# Patient Record
Sex: MALE | Race: WHITE | NOT HISPANIC OR LATINO | URBAN - METROPOLITAN AREA
[De-identification: names, ages, dates, MRNs, and addresses within clinical notes are randomized per-mention and may not be internally consistent; named-entity substitution may affect disease eponyms.]

---

## 2021-04-02 DIAGNOSIS — Z23 ENCOUNTER FOR IMMUNIZATION: ICD-10-CM

## 2021-04-12 ENCOUNTER — IMMUNIZATIONS (OUTPATIENT)
Dept: FAMILY MEDICINE CLINIC | Facility: HOSPITAL | Age: 31
End: 2021-04-12

## 2021-04-12 DIAGNOSIS — Z23 ENCOUNTER FOR IMMUNIZATION: Primary | ICD-10-CM

## 2021-04-12 PROCEDURE — 91300 SARS-COV-2 / COVID-19 MRNA VACCINE (PFIZER-BIONTECH) 30 MCG: CPT

## 2021-04-12 PROCEDURE — 0001A SARS-COV-2 / COVID-19 MRNA VACCINE (PFIZER-BIONTECH) 30 MCG: CPT

## 2021-05-04 ENCOUNTER — IMMUNIZATIONS (OUTPATIENT)
Dept: FAMILY MEDICINE CLINIC | Facility: HOSPITAL | Age: 31
End: 2021-05-04

## 2021-05-04 DIAGNOSIS — Z23 ENCOUNTER FOR IMMUNIZATION: Primary | ICD-10-CM

## 2021-05-04 PROCEDURE — 91300 SARS-COV-2 / COVID-19 MRNA VACCINE (PFIZER-BIONTECH) 30 MCG: CPT

## 2021-05-04 PROCEDURE — 0002A SARS-COV-2 / COVID-19 MRNA VACCINE (PFIZER-BIONTECH) 30 MCG: CPT

## 2022-10-21 ENCOUNTER — OFFICE VISIT (OUTPATIENT)
Dept: UROLOGY | Facility: CLINIC | Age: 32
End: 2022-10-21
Payer: COMMERCIAL

## 2022-10-21 VITALS
BODY MASS INDEX: 42.56 KG/M2 | RESPIRATION RATE: 16 BRPM | WEIGHT: 280.8 LBS | HEART RATE: 80 BPM | SYSTOLIC BLOOD PRESSURE: 120 MMHG | HEIGHT: 68 IN | OXYGEN SATURATION: 96 % | DIASTOLIC BLOOD PRESSURE: 70 MMHG

## 2022-10-21 DIAGNOSIS — Z30.2 ENCOUNTER FOR STERILIZATION: Primary | ICD-10-CM

## 2022-10-21 PROCEDURE — 99203 OFFICE O/P NEW LOW 30 MIN: CPT | Performed by: PHYSICIAN ASSISTANT

## 2022-10-21 RX ORDER — DIAZEPAM 5 MG/1
TABLET ORAL
Qty: 2 TABLET | Refills: 0 | Status: SHIPPED | OUTPATIENT
Start: 2022-10-21

## 2022-10-21 NOTE — PROGRESS NOTES
Referring Physician: No primary care provider on file  A copy of this consultation note was communicated to the referring physician  Diagnoses and all orders for this visit:    Encounter for sterilization  -     diazepam (VALIUM) 5 mg tablet; Take both tablets 1 hour prior to the procedure    Other orders  -     dexamethasone (DECADRON) 0 75 mg tablet;  (Patient not taking: Reported on 10/21/2022)            Assessment and plan:       We had a long discussion regarding the options for birth control  I told the patient that vasectomy is considered to be a permanent surgical sterilization procedure  We spoke about other options including the possibility of vasectomy reversal at a later time  He understands that vasectomy confers no immunity to STDs  I also told him that according to our present knowledge, there is no causal relationship between vasectomy and subsequent development of prostate cancer or testicular cancer  No change in libido erection or ejaculation  We spoke about the potential complications  The most common one in the short term is scrotal hematoma and infection, which rarely requires re-operation  Additionally, he can react to the anesthetic, develop scrotal swelling, have pain or skin bruising  We spoke about post procedure care to try to minimize this complication  I also asked him to refrain from aspirin or fish oil products and alcohol prior to the procedure  The long-term complications include but are not limited to vasectomy failure by recanalization, chronic epididymal discomfort, pain, among other possibilities  I described to him how this procedure is normally performed in an office setting and he understands that if anesthesia is desired, this can be performed for him in an outpatient operative setting  Finally, he understands that following vasectomy, he’ll need to use other means of birth control until he’s had semen analyses that demonstrate the absence of sperm  He understands it will be his responsibility to submit these semen specimens and call our office for the results  I told him again that recanalization is a small but real possibility, and if he is ever concerned about it he can submit another semen specimen for analysis  After discussing the risks, benefits, possible complications and alternatives, informed consents were obtained  Diazepam was prescribed, and review dosing, adverse effects and timing of the procedure  He will return in the near future for the procedure  Chief Complaint     Desire for vasectomy      History of Present Illness     Jennifer Landa is a 34 y o  male referred for evaluation of vasectomy  He has 3 biological children  He states that he and his partner have come to the mutual decision they do not desire any additional children  He has no prior past medical, past surgical, or past urologic history    Detailed Urologic History     - please refer to HPI    Review of Systems     Review of Systems   Constitutional: Negative for activity change and fatigue  HENT: Negative for congestion  Eyes: Negative for visual disturbance  Respiratory: Negative for shortness of breath and wheezing  Cardiovascular: Negative for chest pain and leg swelling  Gastrointestinal: Negative for abdominal pain  Endocrine: Negative for polyuria  Genitourinary: Negative for dysuria, flank pain, hematuria and urgency  Musculoskeletal: Negative for back pain  Allergic/Immunologic: Negative for immunocompromised state  Neurological: Negative for dizziness and numbness  Psychiatric/Behavioral: Negative for dysphoric mood  All other systems reviewed and are negative  Allergies     Not on File    Physical Exam     Physical Exam   Constitutional: He is oriented to person, place, and time  He appears well-developed and well-nourished  No distress  HENT:   Head: Normocephalic and atraumatic     Eyes: EOM are normal  Neck: Normal range of motion  Cardiovascular:   Negative lower extremity edema   Pulmonary/Chest: Effort normal and breath sounds normal    Abdominal: Soft  Genitourinary:   Genitourinary Comments: Vas deferens are palpable bilaterally  Musculoskeletal: Normal range of motion  Neurological: He is alert and oriented to person, place, and time  Skin: Skin is warm  Psychiatric: He has a normal mood and affect  His behavior is normal          Vital Signs  There were no vitals filed for this visit  Current Medications     No current outpatient medications on file  Active Problems     There is no problem list on file for this patient  Past Medical History     No past medical history on file  Surgical History     No past surgical history on file  Family History     No family history on file  Social History     Social History     Social History     Tobacco Use   Smoking Status Not on file   Smokeless Tobacco Not on file       Portions of the record may have been created with voice recognition software  Occasional wrong word or "sound a like" substitutions may have occurred due to the inherent limitations of voice recognition software  Read the chart carefully and recognize, using context, where substitutions have occurred

## 2023-01-11 ENCOUNTER — PROCEDURE VISIT (OUTPATIENT)
Dept: UROLOGY | Facility: CLINIC | Age: 33
End: 2023-01-11

## 2023-01-11 ENCOUNTER — APPOINTMENT (OUTPATIENT)
Dept: LAB | Facility: HOSPITAL | Age: 33
End: 2023-01-11

## 2023-01-11 VITALS
HEIGHT: 68 IN | SYSTOLIC BLOOD PRESSURE: 128 MMHG | HEART RATE: 128 BPM | BODY MASS INDEX: 42.62 KG/M2 | OXYGEN SATURATION: 98 % | DIASTOLIC BLOOD PRESSURE: 70 MMHG | RESPIRATION RATE: 16 BRPM | WEIGHT: 281.2 LBS

## 2023-01-11 DIAGNOSIS — Z30.2 ENCOUNTER FOR STERILIZATION: Primary | ICD-10-CM

## 2023-01-11 NOTE — PROGRESS NOTES
Procedures      No Scalpel Vasectomy Procedure Note    Indications: 34 y o   y o  male desiring permanent sterilization    Pre-operative Diagnosis: Undesired fertility    Post-operative Diagnosis: Undesired fertility    Anesthesia: Lidocaine 2% without epinephrine      Procedure Details     The risks and benefits of the procedure were discussed at the pre-procedure consultation, and written, informed consent obtained  Premedicated with Valium 10 mgm po 60 minutes prior to procedure  The scrotum was palpated with both testes normal in size and position, no masses palpitated  The scrotum was cleansed with warm Betadine and draped in the usual sterile manner  Initial physical exam revealed an easily palpable left vas deferens  I thought that the right vas was palpable though it was difficult to do so and it appeared significantly adhesed to a thickened   right spermatic cord  And her left-sided vasectomy was accomplished  After adequate anesthesia was established, a small perforation was made in the skin and the left vas was isolated with the ring forceps, dissected free and delivered through the skin perforation  The left vas was divided, approximately 1 5 cm portion removed, and each end of the vas was cauterized and suture ligated  The ends of the vas were replaced in the scrotum through the puncture site  Attempted to perform the right sided vasectomy  However I could not palpate or isolate the right vas deferens within the hemiscrotum  This is due to the combination of the patient's thickened spermatic cord and his body habitus  I felt that the procedure would best be accomplished under anesthesia  Any bleeding was controlled with electrocautery  The puncture site was dry when the procedure was completed  After discussion with the patient, the puncture site was sutured using single 5-0 chromic suture in figure 8 fashion  Specimen:   1     Left vas deferens    Condition: Stable    Complications: none    Plan:    Recommended performing his completion right vasectomy in the operating room setting under sedation  Risk benefits and alternatives were discussed with the patient and his wife and both agreed with this plan  I will add him onto my OR schedule for Friday

## 2023-01-12 ENCOUNTER — TELEPHONE (OUTPATIENT)
Dept: UROLOGY | Facility: MEDICAL CENTER | Age: 33
End: 2023-01-12

## 2023-01-12 ENCOUNTER — TELEPHONE (OUTPATIENT)
Dept: UROLOGY | Facility: AMBULATORY SURGERY CENTER | Age: 33
End: 2023-01-12

## 2023-01-12 ENCOUNTER — ANESTHESIA EVENT (OUTPATIENT)
Dept: PERIOP | Facility: AMBULARY SURGERY CENTER | Age: 33
End: 2023-01-12

## 2023-01-12 DIAGNOSIS — Z91.89 AT RISK FOR OBSTRUCTIVE SLEEP APNEA: Primary | ICD-10-CM

## 2023-01-12 NOTE — TELEPHONE ENCOUNTER
I spoke with pt this morning and confirmed scheduling his R  Vasectomy at the [facility] with Dr Caroline Rajput tomorrow on 1/13/23  Pt confirmed that this will work for him  I verbally went over all of pt 's pre op instructions and prep information with him  He is aware that he will need to have a  and he will wait to hear back from the hospital later today regarding his arrival time  Per pt 's request I am e-mailing him a copy of his surgical packet and instructed him to call our office back with any questions or concerns regarding this procedure

## 2023-01-12 NOTE — PRE-PROCEDURE INSTRUCTIONS
NPO after midnight, meds in am with sips of water  Understands when to expect preop phone call  Remove all jewelry  Advised of visitation policy  Before your operation, you play an important role in decreasing your risk for infection by washing with special antiseptic soap  This is an effective way to reduce bacteria on the skin which may help to prevent infections at the surgical site  Please read the following directions in advance  · Use antibacterial soap  2  The day before your operation follow these directions carefully to get ready  · Place clean lines (sheets) on your bed; you should sleep on clean sheets after your evening shower  · Get clean towels and washcloths ready - you need enough for 2 showers  · Set aside clean underwear, pajamas, and clothing to wear after the shower  Reminders:  · DO NOT use any other soap or body rinse on your skin during or after the antiseptic showers  · DO NOT use lotion , powder, deodorant, or perfume/aftershave of any kind on your skin after your antiseptic shower  · DO NOT shave any body parts in the 24 hours/the day before your operation  · DO NOT get the antiseptic soap in your eyes, ears, nose, mouth, or vaginal area  3      You will need to shower the night before AND the morning of your Surgery  Shower 1:  · The evening before your operation, take the fist shower  · First, shampoo your hair with regular shampoo and rinse it completely before you use the anitseptic soap  After washing and rinsing your hair, rinse your body  · Next, use a clean wash cloth to apply the antiseptic soap and wash your body from the neck down to your toes using 1/2 bottle of the antiseptic soap  You will use the other 1/2 bottle for the second shower  · Clean the area where your incision will be; later this area well for about 2 minutes  · If you ar having head or neck surgery, wash areas with the antiseptic soap    · Rinse yourself completely with running water   · Use a clean towel to dry off  · Wear clean underwear and clothing/pajamas  Shower 2:  · The Morning of your operation, take the second shower following the same steps as Shower 1 using the second 1/2 of the bottle of antiseptic soap  · Use clean cloths and towels to was and dry yourself off  · Wear clean underwear and clothing

## 2023-01-13 ENCOUNTER — HOSPITAL ENCOUNTER (OUTPATIENT)
Facility: AMBULARY SURGERY CENTER | Age: 33
Setting detail: OUTPATIENT SURGERY
Discharge: HOME | End: 2023-01-13
Attending: UROLOGY | Admitting: UROLOGY

## 2023-01-13 ENCOUNTER — OFFICE VISIT (OUTPATIENT)
Dept: FAMILY MEDICINE CLINIC | Facility: CLINIC | Age: 33
End: 2023-01-13

## 2023-01-13 ENCOUNTER — ANESTHESIA (OUTPATIENT)
Dept: PERIOP | Facility: AMBULARY SURGERY CENTER | Age: 33
End: 2023-01-13

## 2023-01-13 VITALS
OXYGEN SATURATION: 99 % | WEIGHT: 280.6 LBS | HEART RATE: 78 BPM | SYSTOLIC BLOOD PRESSURE: 130 MMHG | HEIGHT: 68 IN | BODY MASS INDEX: 42.53 KG/M2 | DIASTOLIC BLOOD PRESSURE: 76 MMHG | TEMPERATURE: 98.3 F

## 2023-01-13 VITALS
DIASTOLIC BLOOD PRESSURE: 98 MMHG | SYSTOLIC BLOOD PRESSURE: 158 MMHG | OXYGEN SATURATION: 96 % | TEMPERATURE: 97.6 F | HEART RATE: 88 BPM | RESPIRATION RATE: 20 BRPM

## 2023-01-13 DIAGNOSIS — R53.83 OTHER FATIGUE: ICD-10-CM

## 2023-01-13 DIAGNOSIS — R06.2 WHEEZE: ICD-10-CM

## 2023-01-13 DIAGNOSIS — Z23 NEED FOR TETANUS BOOSTER: ICD-10-CM

## 2023-01-13 DIAGNOSIS — E66.01 SEVERE OBESITY (BMI >= 40) (HCC): Primary | ICD-10-CM

## 2023-01-13 DIAGNOSIS — E78.2 MIXED HYPERLIPIDEMIA: Primary | ICD-10-CM

## 2023-01-13 RX ORDER — BUPIVACAINE HYDROCHLORIDE 5 MG/ML
INJECTION, SOLUTION PERINEURAL AS NEEDED
Status: DISCONTINUED | OUTPATIENT
Start: 2023-01-13 | End: 2023-01-13 | Stop reason: HOSPADM

## 2023-01-13 RX ORDER — SODIUM CHLORIDE, SODIUM LACTATE, POTASSIUM CHLORIDE, CALCIUM CHLORIDE 600; 310; 30; 20 MG/100ML; MG/100ML; MG/100ML; MG/100ML
100 INJECTION, SOLUTION INTRAVENOUS CONTINUOUS
Status: DISCONTINUED | OUTPATIENT
Start: 2023-01-13 | End: 2023-01-13 | Stop reason: HOSPADM

## 2023-01-13 RX ORDER — ALBUTEROL SULFATE 90 UG/1
2 AEROSOL, METERED RESPIRATORY (INHALATION) EVERY 6 HOURS PRN
Qty: 6.7 G | Refills: 5 | Status: SHIPPED | OUTPATIENT
Start: 2023-01-13

## 2023-01-13 RX ORDER — DEXAMETHASONE SODIUM PHOSPHATE 10 MG/ML
INJECTION, SOLUTION INTRAMUSCULAR; INTRAVENOUS AS NEEDED
Status: DISCONTINUED | OUTPATIENT
Start: 2023-01-13 | End: 2023-01-13

## 2023-01-13 RX ORDER — LIDOCAINE HYDROCHLORIDE 20 MG/ML
INJECTION, SOLUTION EPIDURAL; INFILTRATION; INTRACAUDAL; PERINEURAL AS NEEDED
Status: DISCONTINUED | OUTPATIENT
Start: 2023-01-13 | End: 2023-01-13

## 2023-01-13 RX ORDER — FENTANYL CITRATE 50 UG/ML
INJECTION, SOLUTION INTRAMUSCULAR; INTRAVENOUS AS NEEDED
Status: DISCONTINUED | OUTPATIENT
Start: 2023-01-13 | End: 2023-01-13

## 2023-01-13 RX ORDER — FENTANYL CITRATE/PF 50 MCG/ML
25 SYRINGE (ML) INJECTION
Status: DISCONTINUED | OUTPATIENT
Start: 2023-01-13 | End: 2023-01-13 | Stop reason: HOSPADM

## 2023-01-13 RX ORDER — MAGNESIUM HYDROXIDE 1200 MG/15ML
LIQUID ORAL AS NEEDED
Status: DISCONTINUED | OUTPATIENT
Start: 2023-01-13 | End: 2023-01-13 | Stop reason: HOSPADM

## 2023-01-13 RX ORDER — ONDANSETRON 2 MG/ML
4 INJECTION INTRAMUSCULAR; INTRAVENOUS ONCE AS NEEDED
Status: DISCONTINUED | OUTPATIENT
Start: 2023-01-13 | End: 2023-01-13 | Stop reason: HOSPADM

## 2023-01-13 RX ORDER — MIDAZOLAM HYDROCHLORIDE 2 MG/2ML
INJECTION, SOLUTION INTRAMUSCULAR; INTRAVENOUS AS NEEDED
Status: DISCONTINUED | OUTPATIENT
Start: 2023-01-13 | End: 2023-01-13

## 2023-01-13 RX ORDER — ONDANSETRON 2 MG/ML
INJECTION INTRAMUSCULAR; INTRAVENOUS AS NEEDED
Status: DISCONTINUED | OUTPATIENT
Start: 2023-01-13 | End: 2023-01-13

## 2023-01-13 RX ORDER — PROPOFOL 10 MG/ML
INJECTION, EMULSION INTRAVENOUS AS NEEDED
Status: DISCONTINUED | OUTPATIENT
Start: 2023-01-13 | End: 2023-01-13

## 2023-01-13 RX ORDER — CEFAZOLIN SODIUM 2 G/50ML
2000 SOLUTION INTRAVENOUS ONCE
Status: DISCONTINUED | OUTPATIENT
Start: 2023-01-13 | End: 2023-01-13 | Stop reason: HOSPADM

## 2023-01-13 RX ORDER — OXYCODONE HYDROCHLORIDE 5 MG/1
5 TABLET ORAL EVERY 4 HOURS PRN
Status: DISCONTINUED | OUTPATIENT
Start: 2023-01-13 | End: 2023-01-13 | Stop reason: HOSPADM

## 2023-01-13 RX ORDER — OXYCODONE HYDROCHLORIDE 5 MG/1
5 TABLET ORAL EVERY 4 HOURS PRN
Qty: 5 TABLET | Refills: 0 | Status: SHIPPED | OUTPATIENT
Start: 2023-01-13 | End: 2023-01-16 | Stop reason: SDUPTHER

## 2023-01-13 RX ORDER — SODIUM CHLORIDE, SODIUM LACTATE, POTASSIUM CHLORIDE, CALCIUM CHLORIDE 600; 310; 30; 20 MG/100ML; MG/100ML; MG/100ML; MG/100ML
INJECTION, SOLUTION INTRAVENOUS CONTINUOUS PRN
Status: DISCONTINUED | OUTPATIENT
Start: 2023-01-13 | End: 2023-01-13

## 2023-01-13 RX ORDER — HYDRALAZINE HYDROCHLORIDE 20 MG/ML
10 INJECTION INTRAMUSCULAR; INTRAVENOUS ONCE
Status: DISCONTINUED | OUTPATIENT
Start: 2023-01-13 | End: 2023-01-13 | Stop reason: HOSPADM

## 2023-01-13 RX ADMIN — MIDAZOLAM 2 MG: 1 INJECTION INTRAMUSCULAR; INTRAVENOUS at 13:38

## 2023-01-13 RX ADMIN — PROPOFOL 100 MG: 10 INJECTION, EMULSION INTRAVENOUS at 13:53

## 2023-01-13 RX ADMIN — PROPOFOL 300 MG: 10 INJECTION, EMULSION INTRAVENOUS at 13:43

## 2023-01-13 RX ADMIN — PROPOFOL 50 MG: 10 INJECTION, EMULSION INTRAVENOUS at 14:03

## 2023-01-13 RX ADMIN — FENTANYL CITRATE 25 MCG: 50 INJECTION INTRAMUSCULAR; INTRAVENOUS at 13:46

## 2023-01-13 RX ADMIN — SODIUM CHLORIDE, SODIUM LACTATE, POTASSIUM CHLORIDE, AND CALCIUM CHLORIDE: .6; .31; .03; .02 INJECTION, SOLUTION INTRAVENOUS at 13:39

## 2023-01-13 RX ADMIN — OXYCODONE HYDROCHLORIDE 5 MG: 5 TABLET ORAL at 15:39

## 2023-01-13 RX ADMIN — PROPOFOL 100 MG: 10 INJECTION, EMULSION INTRAVENOUS at 13:56

## 2023-01-13 RX ADMIN — FENTANYL CITRATE 25 MCG: 50 INJECTION INTRAMUSCULAR; INTRAVENOUS at 14:15

## 2023-01-13 RX ADMIN — ONDANSETRON 4 MG: 2 INJECTION INTRAMUSCULAR; INTRAVENOUS at 13:53

## 2023-01-13 RX ADMIN — FENTANYL CITRATE 25 MCG: 50 INJECTION INTRAMUSCULAR; INTRAVENOUS at 13:54

## 2023-01-13 RX ADMIN — FENTANYL CITRATE 25 MCG: 50 INJECTION INTRAMUSCULAR; INTRAVENOUS at 13:58

## 2023-01-13 RX ADMIN — DEXAMETHASONE SODIUM PHOSPHATE 10 MG: 10 INJECTION, SOLUTION INTRAMUSCULAR; INTRAVENOUS at 13:44

## 2023-01-13 RX ADMIN — LIDOCAINE HYDROCHLORIDE 100 MG: 20 INJECTION, SOLUTION EPIDURAL; INFILTRATION; INTRACAUDAL; PERINEURAL at 13:43

## 2023-01-13 NOTE — ANESTHESIA POSTPROCEDURE EVALUATION
Post-Op Assessment Note    CV Status:  Stable  Pain Score: 0    Pain management: adequate     Mental Status:  Alert   Hydration Status:  Euvolemic   PONV Controlled:  None   Airway Patency:  Patent   Two or more mitigation strategies used for obstructive sleep apnea   Post Op Vitals Reviewed: Yes      Staff: Anesthesiologist, CRNA         No notable events documented      BP (!) 174/98 (01/13/23 1437)    Temp 97 5 °F (36 4 °C) (01/13/23 1437)    Pulse 86 (01/13/23 1437)   Resp 15 (01/13/23 1437)    SpO2 94 % (01/13/23 1437)

## 2023-01-13 NOTE — PROGRESS NOTES
Assessment/Plan:    No problem-specific Assessment & Plan notes found for this encounter  Diagnoses and all orders for this visit:    Mixed hyperlipidemia  -     Hemoglobin A1c (w/out EAG); Future  -     CBC and differential; Future  -     Comprehensive metabolic panel; Future  -     Lipid Panel with Direct LDL reflex; Future  -     Testosterone; Future  -     T4, free; Future  -     TSH, 3rd generation; Future    Other fatigue  -     Hemoglobin A1c (w/out EAG); Future  -     CBC and differential; Future  -     Comprehensive metabolic panel; Future  -     Lipid Panel with Direct LDL reflex; Future  -     Testosterone; Future  -     T4, free; Future  -     TSH, 3rd generation; Future    Wheeze  -     albuterol (Proventil HFA) 90 mcg/act inhaler; Inhale 2 puffs every 6 (six) hours as needed for wheezing          Subjective:   Chief Complaint   Patient presents with   • [facility]        Patient ID: Royal Sahu is a 34 y o  male  HPI    The following portions of the patient's history were reviewed and updated as appropriate: allergies, current medications, past family history, past medical history, past social history, past surgical history and problem list     Review of Systems   Constitutional: Negative for fatigue, fever and unexpected weight change  HENT: Negative for congestion, sinus pain and sore throat  Eyes: Negative for visual disturbance  Respiratory: Negative for shortness of breath and wheezing  Cardiovascular: Negative for chest pain and palpitations  Gastrointestinal: Negative for abdominal pain, nausea and vomiting  Musculoskeletal: Negative  Negative for arthralgias and myalgias  Neurological: Negative for syncope, weakness and numbness  Psychiatric/Behavioral: Negative  Negative for confusion, dysphoric mood and suicidal ideas           Objective:  Vitals:    01/13/23 1008   BP: 130/76   BP Location: Left arm   Patient Position: Sitting   Cuff Size: Standard Pulse: 78   Temp: 98 3 °F (36 8 °C)   TempSrc: Tympanic   SpO2: 99%   Weight: 127 kg (280 lb 9 6 oz)   Height: 5' 8" (1 727 m)      Physical Exam  Constitutional:       Appearance: He is well-developed  HENT:      Right Ear: Ear canal normal  Tympanic membrane is not injected  Left Ear: Ear canal normal  Tympanic membrane is not injected  Nose: Nose normal    Eyes:      General:         Right eye: No discharge  Left eye: No discharge  Conjunctiva/sclera: Conjunctivae normal       Pupils: Pupils are equal, round, and reactive to light  Neck:      Thyroid: No thyromegaly  Cardiovascular:      Rate and Rhythm: Normal rate and regular rhythm  Heart sounds: Normal heart sounds  No murmur heard  Pulmonary:      Effort: Pulmonary effort is normal  No respiratory distress  Breath sounds: Normal breath sounds  No wheezing  Abdominal:      General: Bowel sounds are normal  There is no distension  Palpations: Abdomen is soft  Tenderness: There is no abdominal tenderness  Musculoskeletal:         General: Normal range of motion  Cervical back: Normal range of motion and neck supple  Lymphadenopathy:      Cervical: No cervical adenopathy  Skin:     General: Skin is warm and dry  Neurological:      Mental Status: He is alert and oriented to person, place, and time  He is not disoriented  Sensory: No sensory deficit  Gait: Gait normal       Deep Tendon Reflexes: Reflexes are normal and symmetric  Psychiatric:         Speech: Speech normal          Behavior: Behavior normal          Thought Content: Thought content normal          Judgment: Judgment normal      Depression Screening Follow-up Plan: Patient's depression screening was positive with a PHQ-2 score of 2  Their PHQ-9 score was 6  Patient assessed for underlying major depression  They have no active suicidal ideations   Brief counseling provided and recommend additional follow-up/re-evaluation next office visit

## 2023-01-13 NOTE — H&P
UROLOGY HISTORY AND PHYSICAL     Patient Identifiers: Angie Minaya (MRN [id number])      Date of Service: 1/13/2023        ASSESSMENT:     34 y o  old male with  desire for surgical sterility  Attempted in office vasectomy performed on Wednesday was complicated by ability to perform the right-sided vasectomy  Recommended proceeding for a right-sided vasectomy under sedation  PLAN:     Right vasectomy under sedation      History of Present Illness:     Angie Minaya is a 34 y o  old with a history of for surgical stability    Past Medical, Past Surgical History:   History reviewed  No pertinent past medical history :    Past Surgical History:   Procedure Laterality Date   • MANDIBLE SURGERY      Marycruz Wright   • WISDOM TOOTH EXTRACTION     :    Medications, Allergies:     Current Facility-Administered Medications:   •  ceFAZolin (ANCEF) IVPB (premix in dextrose) 2,000 mg 50 mL, 2,000 mg, Intravenous, Once, Julieta Apodaca MD    Allergies:  No Known Allergies:    Social and Family History:   Social History:   Social History     Tobacco Use   • Smoking status: Never   • Smokeless tobacco: Never   Vaping Use   • Vaping Use: Never used   Substance Use Topics   • Alcohol use: Yes     Comment: 6 -8 x years   • Drug use: Never     Social History     Tobacco Use   Smoking Status Never   Smokeless Tobacco Never       Family History:  Family History   Problem Relation Age of Onset   • Diabetes type II Mother    • Hypertension Mother    • Heart disease Father    • Diabetes type I Maternal Grandmother    :     Review of Systems:     General: Fever, chills, or night sweats: negative  Cardiac: Negative for chest pain  Pulmonary: Negative for shortness of breath  Gastrointestinal: Abdominal pain negative  Nausea, vomiting, or diarrhea negative  Genitourinary: See HPI above  Patient does nothave hematuria  All other systems queried were negative  Physical Exam:   General: Patient is pleasant and in NAD   Awake and alert  BP (!) 154/106   Pulse 89   Temp 97 7 °F (36 5 °C) (Temporal)   Resp 18   SpO2 98%   HEENT:  Normocephalic atraumatic  Cardiac:  Regular rate and rhythm, Peripheral edema: negative  Pulmonary: Non-labored breathing, CTAB  Abdomen: Soft, non-tender, non-distended  No surgical scars  No masses, tenderness, hernias noted  Genitourinary: negative CVA tenderness, neg suprapubic tenderness  Extremities: normal movement in all 4       Labs:   No results found for: HGB, HCT, WBC, PLT]    No results found for: NA, K, CL, CO2, BUN, CREATININE, CALCIUM, GLUCOSE]    Imaging:   I personally reviewed the images and report of the following studies, and reviewed them with the patient:        Thank you for allowing me to participate in this patients’ care  Please do not hesitate to call with any additional questions    Be Lazar MD

## 2023-01-13 NOTE — ANESTHESIA PREPROCEDURE EVALUATION
Procedure:  VASECTOMY (Right: Scrotum)    Relevant Problems   Other   (+) Severe obesity (BMI >= 40) (HCC)             Anesthesia Plan  ASA Score- 3     Anesthesia Type- IV sedation with anesthesia with ASA Monitors  Additional Monitors:   Airway Plan:           Plan Factors-Exercise tolerance (METS): >4 METS  Chart reviewed  EKG reviewed  Imaging results reviewed  Existing labs reviewed  Patient summary reviewed  Induction- intravenous      Postoperative Plan-     Informed Consent-

## 2023-01-17 ENCOUNTER — TELEPHONE (OUTPATIENT)
Dept: UROLOGY | Facility: CLINIC | Age: 33
End: 2023-01-17

## 2023-01-17 NOTE — OP NOTE
Operative Note     PATIENT:  Trudi Wilkes (MRN [id number])    DATE OF PROCEDURE:   1/17/2023    PRE-OP DIAGNOSIS:   1) desire for surgical sterility    POST-OP DIAGNOSIS:   1) desire for surgical sterility  2) congenital absence of right vas deferens    PROCEDURES PERFORMED:  1) right scrotal exploration    SURGEON:  Camila Stiles MD    ASSISTANTS:    NOTE:  There were no qualified teaching residents to assist with this case    ANESTHESIA: LMA    COMPLICATIONS:   None    ANTIBIOTICS:  Cephazolin    INTRAOPERATIVE THROMBOEMBOLISM PROPHYLAXIS:  Pneumatic compression stockings     INDICATIONS FOR PROCEDURE:  Trudi Wilkes is an 34 y o  old male desire for surgical sterility  His initial preoperative evaluation appeared to demonstrate bilateral palpable right vas deferens although his right spermatic cord was noted to be very thick, his physical exam was difficult due to this finding as well as his body habitus  He initially was scheduled for an in office vasectomy  A left sided vasectomy was performed uneventfully however I could not complete the procedure on the right side  I felt this was due to a combination of the patient's anatomy and body habitus as well as a vasovagal event during the procedure  As such I presented the option of performing a completion right sided vasectomy under anesthesia  Patient provided informed consent to proceed  PROCEDURE IN DETAIL:   The patient was identified and brought to the OR  Antibiotic prophylaxis and DVT prophylaxis were administered  They were placed in the comfortable supine position with care to pad all pressure points  The scrotal hair was clipped and they were prepped and draped in the usual sterile fashion using ChloraPrep  A surgical time out was performed with all in the room in agreement with the correct patient, procedure, indications, and laterality        Once the patient was completely under anesthesia I performed a physical exam   Although the right sided spermatic cord is very thick I am unable to reliably palpate the vas deferens  After initial attempts to mobilize different portions of the spermatic cord I elected to make a small transverse scrotal incision which measured approximately 1 5 cm  Then dissected through the skin and through the dartos layer using electrocautery and actually elected to deliver the testes onto the surgical field  Tunica vaginalis was left intact around the gonad, however I did enter this sharply at the level of the spermatic cord  I formally evaluated the spermatic cord under vision  I did visualize cremasterics fibers, testicular artery and venous structures  However no vas deferens was present  I then evaluated the testicle which appeared devoid of a pleat epididymis and I confirmed that there were no other tubular structures emanating from the right scrotal contents towards the inguinal ring  Intraoperative findings were consistent with a complete unilateral absence of the right vas deferens  After ensuring meticulous hemostasis and irrigated the surgical field the wound was closed in 2 layers  All needle and instrument counts were correct  The patient was placed back supine, awakened from general anesthesia and brought to recovery room in stable condition  ESTIMATED BLOOD LOSS:  Minimal      DRAINS:      SPECIMENS:   * No orders in the log *     COMPLICATIONS: none    DISPOSITION: PACU      PLAN:  Operative findings reviewed with the patient's wife in detail in the recovery room  Have the patient come and see me in 2 weeks time for wound check and at that time we will organize his postvasectomy semen analysis  We will pay close attention to this study to ensure that the patient is azoospermic prior to discontinuing current methods of contraception      In addition, given his physical exam findings I will arrange for renal ultrasound and will also discuss cystic fibrosis testing with the patient

## 2023-01-17 NOTE — TELEPHONE ENCOUNTER
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

## 2023-01-18 NOTE — TELEPHONE ENCOUNTER
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 4  Patient is taking medications as prescribed   Have you had a fever? No  Do you have any difficulty urinating? No  If the patient has an incision- do you have any redness around the incision or any drainage from the incision? No  Discussed normal symptoms post vasectomy and when to call the office  Do you have any other questions or concerns that I can address at this time?   -adjusted post op appt for patient    Discussed post vas semen collection  Discussed normal symptoms post vasectomy

## 2023-02-08 ENCOUNTER — OFFICE VISIT (OUTPATIENT)
Dept: UROLOGY | Facility: CLINIC | Age: 33
End: 2023-02-08

## 2023-02-08 ENCOUNTER — APPOINTMENT (OUTPATIENT)
Dept: LAB | Facility: AMBULARY SURGERY CENTER | Age: 33
End: 2023-02-08

## 2023-02-08 VITALS
WEIGHT: 280 LBS | HEIGHT: 68 IN | DIASTOLIC BLOOD PRESSURE: 82 MMHG | SYSTOLIC BLOOD PRESSURE: 132 MMHG | BODY MASS INDEX: 42.44 KG/M2

## 2023-02-08 DIAGNOSIS — E78.2 MIXED HYPERLIPIDEMIA: ICD-10-CM

## 2023-02-08 DIAGNOSIS — R53.83 OTHER FATIGUE: ICD-10-CM

## 2023-02-08 DIAGNOSIS — Z30.2 ENCOUNTER FOR STERILIZATION: Primary | ICD-10-CM

## 2023-02-08 LAB
ALBUMIN SERPL BCP-MCNC: 3.8 G/DL (ref 3.5–5)
ALP SERPL-CCNC: 121 U/L (ref 46–116)
ALT SERPL W P-5'-P-CCNC: 111 U/L (ref 12–78)
ANION GAP SERPL CALCULATED.3IONS-SCNC: 6 MMOL/L (ref 4–13)
AST SERPL W P-5'-P-CCNC: 59 U/L (ref 5–45)
BASOPHILS # BLD AUTO: 0.05 THOUSANDS/ÂΜL (ref 0–0.1)
BASOPHILS NFR BLD AUTO: 1 % (ref 0–1)
BILIRUB SERPL-MCNC: 0.57 MG/DL (ref 0.2–1)
BUN SERPL-MCNC: 17 MG/DL (ref 5–25)
CALCIUM SERPL-MCNC: 9.9 MG/DL (ref 8.3–10.1)
CHLORIDE SERPL-SCNC: 102 MMOL/L (ref 96–108)
CHOLEST SERPL-MCNC: 227 MG/DL
CO2 SERPL-SCNC: 26 MMOL/L (ref 21–32)
CREAT SERPL-MCNC: 1.17 MG/DL (ref 0.6–1.3)
EOSINOPHIL # BLD AUTO: 0.21 THOUSAND/ÂΜL (ref 0–0.61)
EOSINOPHIL NFR BLD AUTO: 2 % (ref 0–6)
ERYTHROCYTE [DISTWIDTH] IN BLOOD BY AUTOMATED COUNT: 13.2 % (ref 11.6–15.1)
GFR SERPL CREATININE-BSD FRML MDRD: 81 ML/MIN/1.73SQ M
GLUCOSE P FAST SERPL-MCNC: 138 MG/DL (ref 65–99)
HCT VFR BLD AUTO: 43.3 % (ref 36.5–49.3)
HDLC SERPL-MCNC: 36 MG/DL
HGB BLD-MCNC: 14 G/DL (ref 12–17)
IMM GRANULOCYTES # BLD AUTO: 0.03 THOUSAND/UL (ref 0–0.2)
IMM GRANULOCYTES NFR BLD AUTO: 0 % (ref 0–2)
LDLC SERPL CALC-MCNC: 119 MG/DL (ref 0–100)
LYMPHOCYTES # BLD AUTO: 2.98 THOUSANDS/ÂΜL (ref 0.6–4.47)
LYMPHOCYTES NFR BLD AUTO: 32 % (ref 14–44)
MCH RBC QN AUTO: 29.7 PG (ref 26.8–34.3)
MCHC RBC AUTO-ENTMCNC: 32.3 G/DL (ref 31.4–37.4)
MCV RBC AUTO: 92 FL (ref 82–98)
MONOCYTES # BLD AUTO: 0.61 THOUSAND/ÂΜL (ref 0.17–1.22)
MONOCYTES NFR BLD AUTO: 7 % (ref 4–12)
NEUTROPHILS # BLD AUTO: 5.57 THOUSANDS/ÂΜL (ref 1.85–7.62)
NEUTS SEG NFR BLD AUTO: 58 % (ref 43–75)
NRBC BLD AUTO-RTO: 0 /100 WBCS
PLATELET # BLD AUTO: 294 THOUSANDS/UL (ref 149–390)
PMV BLD AUTO: 10.8 FL (ref 8.9–12.7)
POTASSIUM SERPL-SCNC: 4.6 MMOL/L (ref 3.5–5.3)
PROT SERPL-MCNC: 8.1 G/DL (ref 6.4–8.4)
RBC # BLD AUTO: 4.72 MILLION/UL (ref 3.88–5.62)
SODIUM SERPL-SCNC: 134 MMOL/L (ref 135–147)
T4 FREE SERPL-MCNC: 0.84 NG/DL (ref 0.76–1.46)
TESTOST SERPL-MCNC: 248 NG/DL (ref 113–1065)
TRIGL SERPL-MCNC: 360 MG/DL
TSH SERPL DL<=0.05 MIU/L-ACNC: 2.39 UIU/ML (ref 0.45–4.5)
WBC # BLD AUTO: 9.45 THOUSAND/UL (ref 4.31–10.16)

## 2023-02-08 RX ORDER — OXYCODONE HYDROCHLORIDE 5 MG/1
5 TABLET ORAL
Qty: 10 TABLET | Refills: 0 | Status: SHIPPED | OUTPATIENT
Start: 2023-02-08 | End: 2023-02-18

## 2023-02-08 NOTE — PROGRESS NOTES
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  -A right scrotal version was performed under anesthesia due to inability to palpate right vas deferens in office vasectomy  -Intraoperative findings were consistent with 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    Patient has healed well from his in office left facetectomy, and subsequent right scrotal exploration performed for inability to palpate the right vas deferens  The intraoperative findings were consistent with 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      I performed a literature search of unilateral absence of the vas deferens, and associated known congenital abnormalities which include potential unilateral renal agenesis, and cystic fibrosis spectrum      Plan at this point is as follows:  - Postvasectomy semen analysis to be performed 6 weeks post procedurally  - I discussed the association of congenital absence of the vas deferens with a number of other potential medical issues including unilateral kidney, as well as cystic fibrosis  - I recommended a renal ultrasound as well as cystic fibrosis and testing   -Patient referred to genetic counseling for the above  - I have provided oxycodone 5 mg dispense #10 for nighttime pain to hopefully help the patient's sleep and ease his recovery  - I anticipate that his testicular and spermatic cord induration will improve slowly over the next few weeks  -I will monitor his renal ultrasound and semen analysis testing and he will follow-up again as needed    Stefanie Jaffe MD      Chief Complaint     Vasectomy follow-up      History of Present Illness     Osman Nova is a 34 y o  returns in follow-up status post left vasectomy and subsequent right scrotal exploration performed for inability to palpate the left vas deferens    Detailed Urologic History     - please refer to HPI    Review of Systems     Review of Systems   Constitutional: Negative for activity change and fatigue  HENT: Negative for congestion  Eyes: Negative for visual disturbance  Respiratory: Negative for shortness of breath and wheezing  Cardiovascular: Negative for chest pain and leg swelling  Gastrointestinal: Negative for abdominal pain  Endocrine: Negative for polyuria  Genitourinary: Negative for dysuria, flank pain, hematuria and urgency  Musculoskeletal: Negative for back pain  Allergic/Immunologic: Negative for immunocompromised state  Neurological: Negative for dizziness and numbness  Psychiatric/Behavioral: Negative for dysphoric mood  All other systems reviewed and are negative  Allergies     No Known Allergies    Physical Exam       Physical Exam  Constitutional:       General: He is not in acute distress  Appearance: He is well-developed  HENT:      Head: Normocephalic and atraumatic  Cardiovascular:      Comments: Negative lower extremity edema  Pulmonary:      Effort: Pulmonary effort is normal       Breath sounds: Normal breath sounds  Abdominal:      Palpations: Abdomen is soft  Musculoskeletal:         General: Normal range of motion  Cervical back: Normal range of motion  Skin:     General: Skin is warm  Neurological:      Mental Status: He is alert and oriented to person, place, and time     Psychiatric:         Behavior: Behavior normal      Right testicle and vas deferens are indurated  There is no palpable fluid collection or hematoma however  Subcentimeter separation of the skin incision is noted      Vital Signs  Vitals:    02/08/23 0819   BP: 132/82   BP Location: Left arm   Patient Position: Sitting   Cuff Size: Adult   Weight: 127 kg (280 lb)   Height: 5' 8" (1 727 m)         Current Medications       Current Outpatient Medications:   •  albuterol (Proventil HFA) 90 mcg/act inhaler, Inhale 2 puffs every 6 (six) hours as needed for wheezing, Disp: 6 7 g, Rfl: 5  •  oxyCODONE (ROXICODONE) 5 immediate release tablet, Take 1 tablet (5 mg total) by mouth daily at bedtime as needed for severe pain for up to 10 days Max Daily Amount: 5 mg, Disp: 10 tablet, Rfl: 0      Active Problems     Patient Active Problem List   Diagnosis   • Severe obesity (BMI >= 40) ([facility])         Past Medical History     History reviewed  No pertinent past medical history  Surgical History     Past Surgical History:   Procedure Laterality Date   • MANDIBLE SURGERY      Jane Alex   • [place] VASECTOMY UNI/BI SPX W/POSTOP SEMEN EXAMS Right 1/13/2023    Procedure: RIGHT  SCROTUM EXPLORATION;  Surgeon: Cristian Fleming MD;  Location: [facility] MAIN OR;  Service: Urology   • WISDOM TOOTH EXTRACTION           Family History     Family History   Problem Relation Age of Onset   • Diabetes type II Mother    • Hypertension Mother    • Heart disease Father    • Diabetes type I Maternal Grandmother          Social History     Social History     Social History     Tobacco Use   Smoking Status Never   Smokeless Tobacco Never         Pertinent Lab Values     No results found for: CREATININE    No results found for: PSA    @RESULTRCNT(1H])@      Pertinent Imaging      - n/a    Portions of the record may have been created with voice recognition software  Occasional wrong word or "sound a like" substitutions may have occurred due to the inherent limitations of voice recognition software    Read the chart carefully and recognize, using context, where substitutions have occurred

## 2023-02-09 ENCOUNTER — TELEPHONE (OUTPATIENT)
Dept: FAMILY MEDICINE CLINIC | Facility: CLINIC | Age: 33
End: 2023-02-09

## 2023-02-09 ENCOUNTER — TELEPHONE (OUTPATIENT)
Dept: GENETICS | Facility: CLINIC | Age: 33
End: 2023-02-09

## 2023-02-09 LAB
EST. AVERAGE GLUCOSE BLD GHB EST-MCNC: 128 MG/DL
HBA1C MFR BLD: 6.1 %

## 2023-02-09 NOTE — TELEPHONE ENCOUNTER
I called Christopher García to gather more information about possible genetic testing  He reported that he is not aware of a family history of cancer, pancreatitis, immunocompromised individuals, non-smoking-related lung issues, digestive issues such as constipation, and absence of the vas deferens  I told him that I will relay this information to our genetic counselors and that we will follow-up with him regarding next steps

## 2023-02-09 NOTE — TELEPHONE ENCOUNTER
Patient notified as per Dr Castellano -- appointment scheduled for 3/6/23 @ 0830    LAK    ----- Message from Amanda Webb MD sent at 2/9/2023  6:24 AM EST -----  Trigs high---testos low---prob worth appt to disc--VV or OV  ----- Message -----  From: Lab, Background User  Sent: 2/8/2023  10:54 AM EST  To: Amanda Webb MD

## 2023-02-10 ENCOUNTER — TELEPHONE (OUTPATIENT)
Dept: GENETICS | Facility: CLINIC | Age: 33
End: 2023-02-10

## 2023-02-10 NOTE — TELEPHONE ENCOUNTER
I called Zohaib Marshall to let him know that we will be proceeding with testing and will be mailing him a genetic testing kit  I informed him that he can take the kit and script to any [facility]'s lab to have his blood work done  I explained the billing information to him and that Cristobal Mercado will reach out if there is any out-of-pocket cost  I encourage him to call the genetics team at [phone] if he had any questions or concerns

## 2023-02-13 ENCOUNTER — TELEPHONE (OUTPATIENT)
Dept: GENETICS | Facility: CLINIC | Age: 33
End: 2023-02-13

## 2023-02-13 ENCOUNTER — DOCUMENTATION (OUTPATIENT)
Dept: GENETICS | Facility: CLINIC | Age: 33
End: 2023-02-13

## 2023-02-13 NOTE — PROGRESS NOTES
Placed order for Dr Helga Darling CFTR testing due to [address]  Single gene test placed at [facility], kit to be mailed to pt

## 2023-03-06 ENCOUNTER — OFFICE VISIT (OUTPATIENT)
Dept: FAMILY MEDICINE CLINIC | Facility: CLINIC | Age: 33
End: 2023-03-06

## 2023-03-06 VITALS
HEART RATE: 78 BPM | OXYGEN SATURATION: 98 % | RESPIRATION RATE: 18 BRPM | SYSTOLIC BLOOD PRESSURE: 134 MMHG | BODY MASS INDEX: 43.8 KG/M2 | WEIGHT: 289 LBS | HEIGHT: 68 IN | DIASTOLIC BLOOD PRESSURE: 82 MMHG

## 2023-03-06 DIAGNOSIS — Z00.00 WELLNESS EXAMINATION: ICD-10-CM

## 2023-03-06 DIAGNOSIS — E74.39 GLUCOSE INTOLERANCE: ICD-10-CM

## 2023-03-06 DIAGNOSIS — E78.2 MIXED HYPERLIPIDEMIA: ICD-10-CM

## 2023-03-06 DIAGNOSIS — R79.89 ELEVATED LFTS: Primary | ICD-10-CM

## 2023-03-06 DIAGNOSIS — E74.39 GLUCOSE INTOLERANCE: Primary | ICD-10-CM

## 2023-03-06 RX ORDER — METFORMIN HYDROCHLORIDE 500 MG/1
500 TABLET, EXTENDED RELEASE ORAL 2 TIMES DAILY WITH MEALS
Qty: 180 TABLET | Refills: 3 | Status: SHIPPED | OUTPATIENT
Start: 2023-03-06

## 2023-03-06 NOTE — PROGRESS NOTES
HPI:  Rachel Lim is a 34 y o  male here for his yearly health maintenance exam    Patient Active Problem List   Diagnosis   • Severe obesity (BMI >= 40) ([facility])   • Elevated LFTs     Past Medical History:   Diagnosis Date   • Allergic     Seasonal   • Asthma 1999 1  Advanced Directive: n     2  Durable Power of  for Healthcare: n     3  Social History:           Drug and alcohol History: n                  4  Immunizations up to date: y                 Lifestyle:                           Healthy Diet:n                          Alcohol Use:n                          Tobacco Use:n                          Regular exercise:y                          Weight concerns:y                               5  Over the past 2 weeks, how often have you been bothered by the following:              Little interest or pleasure in doing things:n              Felling down, depressed or hopeless:n       Current Outpatient Medications   Medication Sig Dispense Refill   • albuterol (Proventil HFA) 90 mcg/act inhaler Inhale 2 puffs every 6 (six) hours as needed for wheezing 6 7 g 5     No current facility-administered medications for this visit  No Known Allergies  Immunization History   Administered Date(s) Administered   • COVID-19 PFIZER VACCINE 0 3 ML IM 04/12/2021, 05/04/2021, 05/04/2021   • Tdap 01/13/2023       Patient Care Team:  Pavithra Acuna MD as PCP - General (Family Medicine)    Review of Systems   Constitutional: Negative for fatigue, fever and unexpected weight change  HENT: Negative for congestion, sinus pain and sore throat  Eyes: Negative for visual disturbance  Respiratory: Negative for shortness of breath and wheezing  Cardiovascular: Negative for chest pain and palpitations  Gastrointestinal: Negative for abdominal pain, nausea and vomiting  Musculoskeletal: Negative  Negative for arthralgias and myalgias  Neurological: Negative for syncope, weakness and numbness  Psychiatric/Behavioral: Negative  Negative for confusion, dysphoric mood and suicidal ideas  Physical Exam :  Physical Exam  Constitutional:       Appearance: He is well-developed  HENT:      Right Ear: Ear canal normal  Tympanic membrane is not injected  Left Ear: Ear canal normal  Tympanic membrane is not injected  Nose: Nose normal    Eyes:      General:         Right eye: No discharge  Left eye: No discharge  Conjunctiva/sclera: Conjunctivae normal       Pupils: Pupils are equal, round, and reactive to light  Neck:      Thyroid: No thyromegaly  Cardiovascular:      Rate and Rhythm: Normal rate and regular rhythm  Heart sounds: Normal heart sounds  No murmur heard  Pulmonary:      Effort: Pulmonary effort is normal  No respiratory distress  Breath sounds: Normal breath sounds  No wheezing  Abdominal:      General: Bowel sounds are normal  There is no distension  Palpations: Abdomen is soft  Tenderness: There is no abdominal tenderness  Musculoskeletal:         General: Normal range of motion  Cervical back: Normal range of motion and neck supple  Lymphadenopathy:      Cervical: No cervical adenopathy  Skin:     General: Skin is warm and dry  Neurological:      Mental Status: He is alert and oriented to person, place, and time  He is not disoriented  Sensory: No sensory deficit  Gait: Gait normal       Deep Tendon Reflexes: Reflexes are normal and symmetric  Psychiatric:         Speech: Speech normal          Behavior: Behavior normal          Thought Content: Thought content normal          Judgment: Judgment normal            Assessment and Plan:  1  Elevated LFTs        2  Mixed hyperlipidemia        3   Wellness examination            Health Maintenance Due   Topic Date Due   • Hepatitis C Screening  Never done   • HIV Screening  Never done   • BMI: Followup Plan  Never done   • COVID-19 Vaccine (3 - Booster for Futurederm series) 06/29/2021   • Influenza Vaccine (1) Never done

## 2023-03-06 NOTE — PROGRESS NOTES
Assessment/Plan:    No problem-specific Assessment & Plan notes found for this encounter  Diagnoses and all orders for this visit:    Elevated LFTs    Mixed hyperlipidemia    Wellness examination          Subjective:   Chief Complaint   Patient presents with   • Physical Exam     Review labs        Patient ID: Rachel Lim is a 34 y o  male  HPI    The following portions of the patient's history were reviewed and updated as appropriate: allergies, current medications, past family history, past medical history, past social history, past surgical history and problem list     Review of Systems   Constitutional: Negative for appetite change and fatigue  HENT: Negative for ear pain, postnasal drip, sinus pressure and sore throat  Eyes: Negative for redness and visual disturbance  Respiratory: Negative for cough, chest tightness, shortness of breath and wheezing  Cardiovascular: Negative for chest pain, palpitations and leg swelling  Gastrointestinal: Negative for abdominal pain, blood in stool, constipation, diarrhea, nausea and vomiting  Genitourinary: Negative for difficulty urinating, flank pain, hematuria and urgency  Musculoskeletal: Negative for arthralgias, back pain, joint swelling and myalgias  Skin: Negative for rash and wound  No suspicious skin lesions   Neurological: Negative for light-headedness, numbness and headaches  Psychiatric/Behavioral: Negative for confusion, decreased concentration, sleep disturbance and suicidal ideas  The patient is not nervous/anxious  Objective:  Vitals:    03/06/23 0849   BP: 134/82   Pulse: 78   Resp: 18   SpO2: 98%   Weight: 131 kg (289 lb)   Height: 5' 8" (1 727 m)      Physical Exam  Constitutional:       General: He is not in acute distress  Appearance: He is well-developed  He is not diaphoretic  HENT:      Right Ear: Ear canal normal  Tympanic membrane is not injected        Left Ear: Ear canal normal  Tympanic membrane is not injected  Nose: Nose normal       Mouth/Throat:      Pharynx: No oropharyngeal exudate  Eyes:      Conjunctiva/sclera: Conjunctivae normal       Pupils: Pupils are equal, round, and reactive to light  Neck:      Thyroid: No thyromegaly  Cardiovascular:      Rate and Rhythm: Normal rate and regular rhythm  Heart sounds: Normal heart sounds  No murmur heard  Pulmonary:      Effort: Pulmonary effort is normal  No respiratory distress  Breath sounds: Normal breath sounds  No wheezing  Abdominal:      General: Bowel sounds are normal       Palpations: Abdomen is soft  There is no hepatomegaly, splenomegaly or mass  Tenderness: There is no abdominal tenderness  Musculoskeletal:         General: No tenderness or deformity  Normal range of motion  Cervical back: Normal range of motion and neck supple  Skin:     General: Skin is warm and dry  Coloration: Skin is not pale  Findings: No rash  Neurological:      Mental Status: He is alert and oriented to person, place, and time  He is not disoriented  Sensory: No sensory deficit        Gait: Gait normal    Psychiatric:         Speech: Speech normal          Behavior: Behavior normal

## 2023-03-13 ENCOUNTER — TELEPHONE (OUTPATIENT)
Dept: UROLOGY | Facility: CLINIC | Age: 33
End: 2023-03-13

## 2023-03-13 NOTE — TELEPHONE ENCOUNTER
----- Message from Jean Carlos Tang MD sent at 3/13/2023  9:28 AM EDT -----  Regarding: RE: no sample  Thank you    Prieto Evans can you help call to encourage him to complete, re-order if needed  The post vasectomy semen test will be especially important for his case          ----- Message -----  From: Freeman Nagelire  Sent: 3/13/2023   8:45 AM EDT  To: Jean Carlos Tang MD  Subject: no sample                                        Hi Dr Brady Figueroa,    I just wanted to inform you that Pepe Peña did not go and have his sample drawn  We did message him twice regarding his sample  Unfortunately, after one month our office cancels the test and we require the patient to reach out to request a reopening       Thanks,  Aaron Domingo

## 2023-03-15 NOTE — TELEPHONE ENCOUNTER
VM left requesting patient call back      If patient calls back please message me to see if I'm available to take the call

## 2023-03-20 NOTE — TELEPHONE ENCOUNTER
Patient aware that he needs to get semen analysis completed  Had a baby 2 weeks ago   No further concerns

## 2023-04-19 ENCOUNTER — OFFICE VISIT (OUTPATIENT)
Dept: FAMILY MEDICINE CLINIC | Facility: CLINIC | Age: 33
End: 2023-04-19

## 2023-04-19 DIAGNOSIS — L81.9 PIGMENTED SKIN LESION SUSPICIOUS FOR MALIGNANT NEOPLASM: Primary | ICD-10-CM

## 2023-04-19 NOTE — PROGRESS NOTES
Skin excision    Date/Time: 4/19/2023 5:23 PM  Performed by: Yokasta Rasheed MD  Authorized by: Yokasta Rasheed MD   Universal Protocol:  Consent: Verbal consent obtained    Consent given by: patient  Patient understanding: patient states understanding of the procedure being performed      Procedure Details - Skin Excision:     Number of Lesions:  1  Lesion 1:     Malignancy: benign lesion            Repair type:  Linear closure    Closure complexity: simple      Repair size (cm):  2     Repair Comments: 3cc 1% pl xylo--7 sutures 4/0  nylon-----complete excision---path sent--f/u 12 days

## 2023-04-24 ENCOUNTER — TELEPHONE (OUTPATIENT)
Dept: UROLOGY | Facility: CLINIC | Age: 33
End: 2023-04-24

## 2023-04-24 NOTE — TELEPHONE ENCOUNTER
Please let Ron Isaacs know his kidney ultrasound does confirm the presence of just 1 kidney  He has a normal left kidney and an absent right kidney  This does correspond to his recent finding of a single vas deferens  This is a congenital complex  There are no specific precautions to take aside from focusing on hydration, blood pressure control, avoiding excess salt, regular exercise and weight loss to meet the goals of preserving his kidney function for decades to come  Also I do recommend that he complete the cystic fibrosis genetic testing that was sent to him from the genetic counselors as a precaution      Thank you

## 2023-04-25 NOTE — TELEPHONE ENCOUNTER
Read patient the note from Dr Arloa Riedel  Patient reports he did discuss this with his PCP already and reports no follow up questions  He reports he needs blood work in about 4 weeks and will get the genetic test done then  Remy Sol he will have to call the office to get the orders reordered since when he went to go get the testing originally the orders werent available for him

## 2023-05-01 ENCOUNTER — HOSPITAL ENCOUNTER (OUTPATIENT)
Dept: SLEEP CENTER | Facility: CLINIC | Age: 33
Discharge: HOME | End: 2023-05-01

## 2023-05-01 ENCOUNTER — PROCEDURE VISIT (OUTPATIENT)
Dept: FAMILY MEDICINE CLINIC | Facility: CLINIC | Age: 33
End: 2023-05-01

## 2023-05-01 ENCOUNTER — OFFICE VISIT (OUTPATIENT)
Dept: SLEEP CENTER | Facility: CLINIC | Age: 33
End: 2023-05-01

## 2023-05-01 ENCOUNTER — TELEPHONE (OUTPATIENT)
Dept: FAMILY MEDICINE CLINIC | Facility: CLINIC | Age: 33
End: 2023-05-01

## 2023-05-01 VITALS — WEIGHT: 283 LBS | TEMPERATURE: 97.5 F | HEART RATE: 68 BPM | BODY MASS INDEX: 41.79 KG/M2 | OXYGEN SATURATION: 98 %

## 2023-05-01 VITALS
HEART RATE: 66 BPM | BODY MASS INDEX: 42.36 KG/M2 | HEIGHT: 69 IN | OXYGEN SATURATION: 97 % | WEIGHT: 286 LBS | SYSTOLIC BLOOD PRESSURE: 128 MMHG | DIASTOLIC BLOOD PRESSURE: 70 MMHG

## 2023-05-01 DIAGNOSIS — G47.33 OSA (OBSTRUCTIVE SLEEP APNEA): Primary | ICD-10-CM

## 2023-05-01 DIAGNOSIS — Z91.89 AT RISK FOR OBSTRUCTIVE SLEEP APNEA: ICD-10-CM

## 2023-05-01 DIAGNOSIS — G47.19 EXCESSIVE DAYTIME SLEEPINESS: ICD-10-CM

## 2023-05-01 DIAGNOSIS — G47.33 OSA (OBSTRUCTIVE SLEEP APNEA): ICD-10-CM

## 2023-05-01 DIAGNOSIS — Z48.02 VISIT FOR SUTURE REMOVAL: Primary | ICD-10-CM

## 2023-05-01 DIAGNOSIS — E66.01 MORBID OBESITY (HCC): ICD-10-CM

## 2023-05-01 NOTE — PROGRESS NOTES
" Consultation - Kim Splinter  34 y o  male  :1990  VLV:[id number]  SYQ:[id number]    Physician Requesting Consult: Rosa Feliz, [address]             Reason for Consult : At your kind request I saw Lorene Bledsoe for initial sleep evaluation today  The patient is here to evaluate for suspected Obstructive Sleep Apnea  PFSH, Problem List, Medications & Allergies were reviewed in EMR  Bubba Edmond  has a past medical history of Allergic and Asthma ()  He has a current medication list which includes the following prescription(s): albuterol and metformin  HPI: He is here for wife's complaints of snoring and witnessed apneas of at least 6 years duration  He is not aware of snoring or breathing difficulties during sleep but awakens feeling thirsty  Other Complaints: He tried a mandibular advancement device but had to discontinue use because it caused jaw pain  Avis Weller Restless Leg Syndrome: reports no suggestive symptoms  Parasomnia: reports teeth grinding during sleep;   Sleep Routine (on average): Typical Bedtime: 11 PM gets OOB: 7 AM TIB:8 hrs  Sleep latency:< 15 minutes Sleep Interruptions:2-3/nite not always sure of the cause and able to fall back asleep  Awakens: needing an alarm  Upon awakening: is not always refreshed  Bubba Edmond reports excessive daytime sleepiness [dozes off when sedentary at work and dozes off when sedentary at home]  He rated [himself] at Total score: 10 /24 on the [place] Sleepiness Scale  Habits:   reports that he has never smoked  He has never used smokeless tobacco ;  reports current alcohol use ;[ reports no history of drug use  ];[  E-Cigarette/Vaping    E-Cigarette Use Never User    ]; Caffeine use:limited; Exercise routine: \"not enough\"  Family History: Negative for sleep disturbance  ROS: Significant for weight has been stable  He has nasal symptoms due to environmental allergies    He has episodic respiratory symptoms but recently has " "not been needing albuterol  He reported no cardiac symptoms  Santos Harm EXAM:  /70 (BP Location: Right arm, Patient Position: Sitting, Cuff Size: Standard)   Pulse 66   Ht 5' 9\" (1 753 m)   Wt 130 kg (286 lb)   SpO2 97%   BMI 42 23 kg/m²    General: Well groomed male, well appearing, in no apparent distress  Neurological: Alert and orientated; cooperative; Cranial nerves intact;    Psychiatric: Speech: Clear and coherent; normal mood, affect & thought   Skin: Warm and dry; Color& Hydration good; no facial rashes or lesions   HEENT:  Craniofacial anatomy: normal Sinuses: Non-tender  TMJ: Normal   Eyes: EOM's intact; conjunctiva/corneas clear   Ears: Externally appear normal     Nasal Airway: is patent Septum: Intact; Mucosa: Normal; Turbinates: Normal; Rhinorrhea: None  Mouth: Lips: Normal posture; Dentition: normal   Mucosa: Moist; Hard Palate:normal    Oropharryx: crowded, AP narrowing  and laterally narrowedTongue: Mallampati:Class IV and MobileSoft Palate:  redundant  and Uvular Hypertrophy Tonsils: 2+   Neck:[is thick;] [Neck Circumference: 17 \";] Supple; no abnormal masses; Thyroid: Normal  Trachea: Central     Lymph: No cervical or submandibular Lymhadenopathy  Heart: S1,S2 normal; RRR; no gallop; no murmur s  Lungs: Respiratory Effort: Normal  Air entry good bilaterally  No wheezes  No rales  Abdomen: Obese, soft & non-tender    Extremities: No pedal edema  No clubbing or cyanosis  Musculoskeletal:  Motor normal; Gait: Normal        Last CMP demonstrated CO2 of 26 mmol/L but elevated liver enzymes suggestive of fatty liver  CBC was normal     IMPRESSION: Primary/Secondary Sleep Diagnoses (to Medical or Psych conditions) & Comorbidities   1  DAVID (obstructive sleep apnea)        2  Excessive daytime sleepiness        3  Morbid obesity ([address] )        4  At risk for obstructive sleep apnea  Ambulatory Referral to Sleep Medicine           PLAN:  1   With respect to above conditions, comprehensive " "counseling provided on pathophysiology; effects on symptoms and comorbidities, diagnostic strategies & limitations; treatment options; risks or no treament; risks & benefits of the various therapeutic options; costs and insurance aspects  2  I advised weight reduction, avoiding sleeping supine, using alcohol or sedating medications close to bed time and on safe driving practices  3  Nocturnal polysomnography is indicated and a diagnostic study will be scheduled  4   Patient is willing to try Positive airway pressure therapy and will be scheduled for a titration study if indicated  5  Follow-up to be scheduled after the studies to review results, further details of treatment options and to initiate/adjust therapy  Sincerely,      Authenticated electronically on 03/29/04   Board Certified Specialist     Portions of the record may have been created with voice recognition software  Occasional wrong word or \"sound a like\" substitutions may have occurred due to the inherent limitations of voice recognition software  There may also be notations and random deletions of words or characters from malfunctioning software  Read the chart carefully and recognize, using context, where substitutions/deletions have occurred       "

## 2023-05-01 NOTE — PATIENT INSTRUCTIONS
What is DAVID? Obstructive sleep apnea is a common and serious sleep disorder that causes you to stop breathing during sleep  The airway repeatedly becomes blocked, limiting the amount of air that reaches your lungs  When this happens, you may snore loudly or making choking noises as you try to breathe  Your brain and body becomes oxygen deprived and you may wake up  This may happen a few times a night, or in more severe cases, several hundred times a night  Sleep apnea can make you wake up in the morning feeling tired or unrefreshed even though you have had a full night of sleep  During the day, you may feel fatigued, have difficulty concentrating or you may even unintentionally fall asleep  This is because your body is waking up numerous times throughout the night, even though you might not be conscious of each awakening  The lack of oxygen your body receives can have negative long-term consequences for your health  This includes:  High blood pressure  Heart disease  Irregular heart rhythms  Stroke  Pre-diabetes and diabetes  Depression  Testing  An objective evaluation of your sleep may be needed before your board certified sleep physician can make a diagnosis  Options include:   In-lab overnight sleep study  This type of sleep study requires you to stay overnight at a sleep center, in a bed that may resemble a hotel room  You will sleep with sensors hooked up to various parts of your body  These sensors record your brain waves, heartbeat, breathing and movement  An overnight sleep study also provides your doctor with the most complete information about your sleep  Learn more about an overnight sleep study  Home sleep apnea test  Some patients with high risk factors for obstructive sleep apnea and no other medical disorders may be candidates for a home sleep apnea test  The testing equipment differs in that it is less complicated than what is used in an overnight sleep study   As such, does not give all the data an in-lab will and if negative, may not mean you do not have the problem  Treatment for sleep apnea includes using a continuous positive airway pressure (CPAP) machine to keep your airway open during sleep  A mask is placed over your nose and mouth, or just your nose  The mask is hooked to the CPAP machine that blows a gentle stream of air into the mask when you breathe  This helps keep your airway open so you can breathe more regularly  Extra oxygen may be given to you through the machine  You may be given a mouth device  It looks like a mouth guard or dental retainer and stops your tongue and mouth tissues from blocking your throat while you sleep  Surgery may be needed to remove extra tissues that block your mouth, throat, or nose  Manage sleep apnea:   Do not smoke  Nicotine and other chemicals in cigarettes and cigars can cause lung damage  Ask your healthcare provider for information if you currently smoke and need help to quit  E-cigarettes or smokeless tobacco still contain nicotine  Talk to your healthcare provider before you use these products  Do not drink alcohol or take sedative medicine before you go to sleep  Alcohol and sedatives can relax the muscles and tissues around your throat  This can block the airflow to your lungs  Maintain a healthy weight  Excess tissue around your throat may restrict your breathing  Ask your healthcare provider for information if you need to lose weight  Sleep on your side or use pillows designed to prevent sleep apnea  This prevents your tongue or other tissues from blocking your throat  You can also raise the head of your bed  Driving Safety  Refrain from driving when drowsy  Follow up with your healthcare provider as directed: Write down your questions so you remember to ask them during your visits  Go to AASM website for more information: Sleepeducation  org  What is DAVID?    Obstructive sleep apnea is a common and serious sleep disorder that causes you to stop breathing during sleep  The airway repeatedly becomes blocked, limiting the amount of air that reaches your lungs  When this happens, you may snore loudly or making choking noises as you try to breathe  Your brain and body becomes oxygen deprived and you may wake up  This may happen a few times a night, or in more severe cases, several hundred times a night  Sleep apnea can make you wake up in the morning feeling tired or unrefreshed even though you have had a full night of sleep  During the day, you may feel fatigued, have difficulty concentrating or you may even unintentionally fall asleep  This is because your body is waking up numerous times throughout the night, even though you might not be conscious of each awakening  The lack of oxygen your body receives can have negative long-term consequences for your health  This includes:  High blood pressure  Heart disease  Irregular heart rhythms  Stroke  Pre-diabetes and diabetes  Depression  Testing  An objective evaluation of your sleep may be needed before your board certified sleep physician can make a diagnosis  Options include:   In-lab overnight sleep study  This type of sleep study requires you to stay overnight at a sleep center, in a bed that may resemble a hotel room  You will sleep with sensors hooked up to various parts of your body  These sensors record your brain waves, heartbeat, breathing and movement  An overnight sleep study also provides your doctor with the most complete information about your sleep  Learn more about an overnight sleep study  Home sleep apnea test  Some patients with high risk factors for obstructive sleep apnea and no other medical disorders may be candidates for a home sleep apnea test  The testing equipment differs in that it is less complicated than what is used in an overnight sleep study  As such, does not give all the data an in-lab will and if negative, may not mean you do not have the problem    Treatment for sleep apnea includes using a continuous positive airway pressure (CPAP) machine to keep your airway open during sleep  A mask is placed over your nose and mouth, or just your nose  The mask is hooked to the CPAP machine that blows a gentle stream of air into the mask when you breathe  This helps keep your airway open so you can breathe more regularly  Extra oxygen may be given to you through the machine  You may be given a mouth device  It looks like a mouth guard or dental retainer and stops your tongue and mouth tissues from blocking your throat while you sleep  Surgery may be needed to remove extra tissues that block your mouth, throat, or nose  Manage sleep apnea:   Do not smoke  Nicotine and other chemicals in cigarettes and cigars can cause lung damage  Ask your healthcare provider for information if you currently smoke and need help to quit  E-cigarettes or smokeless tobacco still contain nicotine  Talk to your healthcare provider before you use these products  Do not drink alcohol or take sedative medicine before you go to sleep  Alcohol and sedatives can relax the muscles and tissues around your throat  This can block the airflow to your lungs  Maintain a healthy weight  Excess tissue around your throat may restrict your breathing  Ask your healthcare provider for information if you need to lose weight  Sleep on your side or use pillows designed to prevent sleep apnea  This prevents your tongue or other tissues from blocking your throat  You can also raise the head of your bed  Driving Safety  Refrain from driving when drowsy  Follow up with your healthcare provider as directed: Write down your questions so you remember to ask them during your visits  Go to AAS website for more information: Sleepeducation  org    Nursing Support:  When: Monday through Friday 7A-5PM except holidays  Where: Our direct line is [phone]  If you are having a true emergency please call 911    In the event that the line is busy or it is after hours please leave a voice message and we will return your call  Please speak clearly, leaving your full name, birth date, best number to reach you and the reason for your call  Medication refills: We will need the name of the medication, the dosage, the ordering provider, whether you get a 30 or 90 day refill, and the pharmacy name and address  Medications will be ordered by the provider only  Nurses cannot call in prescriptions  Please allow 7 days for medication refills  Physician requested updates: If your provider requested that you call with an update after starting medication, please be ready to provide us the medication and dosage, what time you take your medication, the time you attempt to fall asleep, time you fall asleep, when you wake up, and what time you get out of bed  Sleep Study Results: We will contact you with sleep study results and/or next steps after the physician has reviewed your testing

## 2023-05-01 NOTE — TELEPHONE ENCOUNTER
PT AWARE    ----- Message from Aura Lane MD sent at 4/29/2023  1:07 PM EDT -----  Not cancer--clean margins--no further treatment  ----- Message -----  From: Lab, Background User  Sent: 4/27/2023  12:41 PM EDT  To: Aura Lane MD

## 2023-05-01 NOTE — PROGRESS NOTES
Home Sleep Study Documentation    HOME STUDY DEVICE: Noxturnal yes                                           Day G3 no      Pre-Sleep Home Study:    Set-up and instructions performed by: MM    Technician performed demonstration for Patient: yes    Return demonstration performed by Patient: yes    Written instructions provided to Patient: yes    Patient signed consent form: yes        Post-Sleep Home Study:    Additional comments by Patient: none    Home Sleep Study Failed:no:    Failure reason: N/A    Reported or Detected: N/A    Scored by: [facility] Cee

## 2023-05-01 NOTE — TELEPHONE ENCOUNTER
Genetics did rech out to patient in march regarding canceled lab work and to reach out to them when he is ready for testing

## 2023-05-01 NOTE — PROGRESS NOTES
[address]  [facility]        NAME: Bradford Person is a 34 y o  male  : 1990    MRN: [id number]  DATE: May 1, 2023  TIME: 4:19 PM    Assessment and Plan   Visit for suture removal [Z48 02]  1  Visit for suture removal              Patient Instructions     Patient Instructions   Patient presents for suture removal  The wound is well healed without signs of infection  The sutures are removed  Wound care and activity instructions given  Return prn  Chief Complaint     Chief Complaint   Patient presents with    Suture / Staple Removal         History of Present Illness       Here for suture removal  Dr Castellano removed a lesion and placed 12 sutures 12 days ago  No problems/concerns  Review of Systems   Review of Systems   Constitutional: Negative for activity change and fever  Skin: Negative for color change          S/p lesion removed from back sutures placed         Current Medications       Current Outpatient Medications:     albuterol (Proventil HFA) 90 mcg/act inhaler, Inhale 2 puffs every 6 (six) hours as needed for wheezing, Disp: 6 7 g, Rfl: 5    metFORMIN (GLUCOPHAGE-XR) 500 mg 24 hr tablet, Take 1 tablet (500 mg total) by mouth 2 (two) times a day with meals, Disp: 180 tablet, Rfl: 3    Current Allergies     Allergies as of 2023    (No Known Allergies)            The following portions of the patient's history were reviewed and updated as appropriate: allergies, current medications, past family history, past medical history, past social history, past surgical history and problem list      Past Medical History:   Diagnosis Date    Allergic     Seasonal    Asthma        Past Surgical History:   Procedure Laterality Date    MANDIBLE SURGERY      Blinda Haley    HI VASECTOMY UNI/BI SPX W/POSTOP SEMEN EXAMS Right 2023    Procedure: RIGHT  SCROTUM EXPLORATION;  Surgeon: Gustavo Green MD;  Location: [facility]IN OR;  Service: Urology    [address] [place] [address] EXTRACTION         Family History   Problem Relation Age of Onset    Diabetes type II Mother     Hypertension Mother     Diabetes Mother         Type 2    Heart disease Father     Diabetes type I Maternal Grandmother     Diabetes Maternal Grandmother         Type 1         Medications have been verified  Objective   Pulse 68   Temp 97 5 °F (36 4 °C) (Tympanic)   Wt 128 kg (283 lb)   SpO2 98%   BMI 41 79 kg/m²        Physical Exam     Physical Exam  Vitals and nursing note reviewed  Constitutional:       General: He is not in acute distress  Appearance: Normal appearance  He is not ill-appearing  HENT:      Head: Normocephalic  Pulmonary:      Effort: Pulmonary effort is normal    Skin:     General: Skin is warm and dry  Comments: Skim lesion removed 12 days ago  Site is clean/dry 7 sutures intact  No signs of infections  Neurological:      Mental Status: He is alert and oriented to person, place, and time  Psychiatric:         Mood and Affect: Mood normal          Behavior: Behavior normal      Suture removal    Date/Time: 5/1/2023 4:00 PM  Performed by: ALEX Garay  Authorized by: ALEX Garay   Universal Protocol:  Risks and benefits: risks, benefits and alternatives were discussed  Consent given by: patient  Patient understanding: patient states understanding of the procedure being performed  Patient identity confirmed: verbally with patient        Patient location: exam room  Location:     Laterality:  Right    Location:  Trunk    Trunk location:  Back  Procedure details: Tools used:  Suture removal kit    Wound appearance:  No sign(s) of infection, good wound healing and clean  Post-procedure details:     Post-removal:  Antibiotic ointment applied and Band-Aid applied    Patient tolerance of procedure:   Tolerated well, no immediate complications

## 2023-05-01 NOTE — PATIENT INSTRUCTIONS
Patient presents for suture removal  The wound is well healed without signs of infection  The sutures are removed  Wound care and activity instructions given  Return prn

## 2023-05-11 ENCOUNTER — TELEPHONE (OUTPATIENT)
Dept: SLEEP CENTER | Facility: CLINIC | Age: 33
End: 2023-05-11

## 2023-05-11 DIAGNOSIS — G47.33 OSA (OBSTRUCTIVE SLEEP APNEA): Primary | ICD-10-CM

## 2023-05-11 NOTE — TELEPHONE ENCOUNTER
Patient left message asking for results of sleep study  Returned call and advised patient that sleep study resulted and shows severe DAVID (AUGUSTINA 40 5) and hypoxia  CPAP titration recommended  Advised patient that a message will be sent to Dr Devonte Saunders to see how he would like to proceed  Nursing will then call him back with the doctor's recommendations  Per 5/1/23 office note: Patient is willing to try Positive airway pressure therapy and will be scheduled for a titration study if indicated  Dr Devonte Saunders, did you want to place order for CPAP titration?

## 2023-05-14 ENCOUNTER — HOSPITAL ENCOUNTER (OUTPATIENT)
Dept: SLEEP CENTER | Facility: CLINIC | Age: 33
Discharge: HOME | End: 2023-05-14

## 2023-05-14 DIAGNOSIS — G47.33 OSA (OBSTRUCTIVE SLEEP APNEA): ICD-10-CM

## 2023-05-15 DIAGNOSIS — G47.33 OSA (OBSTRUCTIVE SLEEP APNEA): Primary | ICD-10-CM

## 2023-05-15 NOTE — PROGRESS NOTES
Sleep Study Documentation    Pre-Sleep Study       Sleep testing procedure explained to patient:YES    Patient napped prior to study:NO    [address] [place] worker after 12PM   Caffeine use:NO    Alcohol:Dayshift workers after 5PM: Alcohol use:NO    Typical day for patient:YES       Study Documentation    Sleep Study Indications:  DAVID-diagnosed home study at [facility]  Sleep Study: Treatment   Optimal PAP pressure:  12 cm H2O  Leak:Small  Snore:Eliminated  REM Obtained:yes  Supplemental O2: no    Minimum SaO2  88 0 %  Baseline SaO2  95 4 %  PAP mask tried (list all) Medium ResMed AirFit N20 Nasal Mask with no added humidity  PAP mask choice (final) Same  PAP mask type:nasal  PAP pressure at which snoring was eliminated  12 cm H2O  Minimum SaO2 at final PAP pressure  91 0 %  Mode of Therapy:CPAP  ETCO2:No  CPAP changed to BiPAP:No    Mode of Therapy:CPAP    EKG abnormalities: no     EEG abnormalities: yes:  ECG artifact throughout study  Sleep Study Recorded < 2 hours: N/A    Sleep Study Recorded > 2 hours but incomplete study: N/A    Sleep Study Recorded 6 hours but no sleep obtained: NO          Post-Sleep Study    Medication used at bedtime or during sleep study:NO    Patient reports time it took to fall asleep:less than 20 minutes    Patient reports waking up during study:1 to 2 times  Patient reports returning to sleep without difficulty  Patient reports sleeping 6 to 8 hours without dreaming  Patient reports sleep during study:typical    Patient rated sleepiness: Somewhat sleepy or tired    PAP treatment:yes: Post PAP treatment patient reports feeling unsure if a change is noted and  would wear PAP mask at home

## 2023-05-23 ENCOUNTER — TELEPHONE (OUTPATIENT)
Dept: SLEEP CENTER | Facility: CLINIC | Age: 33
End: 2023-05-23

## 2023-05-23 NOTE — TELEPHONE ENCOUNTER
Patient with AUGUSTINA of 40 5 severe DAVID, completed CPAP study and Dr Eleazar Ricketts ordered CPAP     Spoke to the patient advised sleep study results and scheduled DME set up and compliance appointments       RX and clinicals sent to Yossi Ratliff

## 2023-05-26 LAB
DME PARACHUTE DELIVERY DATE EXPECTED: NORMAL
DME PARACHUTE DELIVERY DATE REQUESTED: NORMAL
DME PARACHUTE DELIVERY NOTE: NORMAL
DME PARACHUTE ITEM DESCRIPTION: NORMAL
DME PARACHUTE ORDER STATUS: NORMAL
DME PARACHUTE SUPPLIER NAME: NORMAL
DME PARACHUTE SUPPLIER PHONE: NORMAL

## 2023-06-08 LAB

## 2023-07-31 ENCOUNTER — OFFICE VISIT (OUTPATIENT)
Dept: SLEEP CENTER | Facility: CLINIC | Age: 33
End: 2023-07-31
Payer: COMMERCIAL

## 2023-07-31 VITALS
WEIGHT: 280 LBS | DIASTOLIC BLOOD PRESSURE: 80 MMHG | BODY MASS INDEX: 41.47 KG/M2 | HEIGHT: 69 IN | SYSTOLIC BLOOD PRESSURE: 140 MMHG

## 2023-07-31 DIAGNOSIS — G47.19 EXCESSIVE DAYTIME SLEEPINESS: ICD-10-CM

## 2023-07-31 DIAGNOSIS — F51.12 INSUFFICIENT SLEEP SYNDROME: ICD-10-CM

## 2023-07-31 DIAGNOSIS — G47.33 OSA (OBSTRUCTIVE SLEEP APNEA): Primary | ICD-10-CM

## 2023-07-31 DIAGNOSIS — E66.01 MORBID OBESITY (HCC): ICD-10-CM

## 2023-07-31 PROCEDURE — 99214 OFFICE O/P EST MOD 30 MIN: CPT | Performed by: INTERNAL MEDICINE

## 2023-07-31 NOTE — PROGRESS NOTES
Follow-Up Note - Sleep Sada Moorek  28 y.o. male  :1990  KJS:[id number]  DOS:2023    CC: I saw this patient for follow-up in clinic today for Sleep disordered breathing, Coexisting Sleep and Medical Problems. Interval changes: Treatment was initiated using a ResMed machine. Patient had home sleep study was undertaken to evaluate for sleep disordered breathing, followed by a subsequent titration study. The patient is here to review results and to initiate therapy / discuss further options. The study demonstrated: respiratory event index (AUGUSTINA) of 40.5. The lowest SPO2 recorded is 73% and 8.2% of the study was spent with saturations below 90%. The snore index was 71.5%. During the subsequent therapeutic study, sleep disordered breathing was successfully remediated with PAP at 12 H2O. PFSH, Problem List, Medications & Allergies were reviewed in EMR. He  has a past medical history of Allergic and Asthma (). He has a current medication list which includes the following prescription(s): albuterol and metformin. PHYSIOLOGICAL DATA REVIEW : Using PAP > 4 hours/night 90%. Estimated AUGUSTINA 3.4/hour with pressure of 12cm Jv@[place] percentile; patient has not been using non FDA approved devices to sanitize the machine. INTERPRETATION: Compliance is excellent; Pressure setting is:optimal; ;   SUBJECTIVE: With respect to use of PAP, Soraya Vines  is experiencing no adverse effects:. He derives benefit. Is satisfied with sleep and daytime function. 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 ; he has no difficulty initiating or maintaining sleep . He arises spontaneously and feels more refreshed since on Rx. Soraya Vines reports significantly improved daytime sleepiness, naps occasionally. Neva Rivera He rated [himself] at Total score: 8 /24 on the [place] Sleepiness Scale. Other issues: None reported. Habits:[ reports that he has never smoked. He has never used smokeless tobacco.], [ reports current alcohol use.], [ reports no history of drug use.], Caffeine use:limited; Exercise routine: regular. ROS: Significant for few pounds weight reduction. He reported no nasal, respiratory or cardiac symptoms. Charlene Woo EXAM: /80   Ht 5' 9" (1.753 m)   Wt 127 kg (280 lb)   BMI 41.35 kg/m²     Wt Readings from Last 3 Encounters:   07/31/23 127 kg (280 lb)   05/01/23 128 kg (283 lb)   05/01/23 130 kg (286 lb)      Patient is well groomed; well appearing. CNS: Alert, orientated, speech clear & coherent  Psych: cooperative and in no distress. Mental state: Appears normal.  H&N: EOMI; NC/AT: No facial pressure marks, no rashes. Skin/Extrem: col & hydration normal; no edema  Resp: Respiratory effort is normal  Physical findings otherwise essentially unchanged from previous. IMPRESSION: Problem List Items & Comorbidities Addressed this Visit    1. DAVID (obstructive sleep apnea)  PAP DME Resupply/Reorder      2. Excessive daytime sleepiness        3. Insufficient sleep syndrome        4. Morbid obesity ([address])            PLAN:  1. I reviewed results of prior studies and physiologic data with the patient. 2. I discussed treatment options with risks and benefits. 3. Treatment with  PAP is medically necessary and Zayra Keller is agreable to continue use. 4. Care of equipment, methods to improve comfort using PAP and importance of compliance with therapy were discussed. 5. Pressure setting: continue 12 cmH2O.    6. Rx provided to replace supplies and Care coordinated with DME provider. 7. Discussed strategies for weight reduction. 8. Also advised allowing sufficient opportunity for sleep. 9. Follow-up is advised in 1 year or sooner if needed to monitor progress, compliance and to adjust therapy. Thank you for allowing me to participate in the care of this patient.     Sincerely,     Authenticated electronically on 88/48/76   Board Certified Specialist Portions of the record may have been created with voice recognition software. Occasional wrong word or "sound a like" substitutions may have occurred due to the inherent limitations of voice recognition software. There may also be notations and random deletions of words or characters from malfunctioning software. Read the chart carefully and recognize, using context, where substitutions/deletions have occurred.

## 2023-07-31 NOTE — PATIENT INSTRUCTIONS

## 2023-08-01 ENCOUNTER — TELEPHONE (OUTPATIENT)
Dept: SLEEP CENTER | Facility: CLINIC | Age: 33
End: 2023-08-01

## 2023-08-02 LAB

## 2024-05-04 ENCOUNTER — APPOINTMENT (EMERGENCY)
Dept: RADIOLOGY | Facility: HOSPITAL | Age: 34
End: 2024-05-04
Payer: COMMERCIAL

## 2024-05-04 ENCOUNTER — HOSPITAL ENCOUNTER (EMERGENCY)
Facility: HOSPITAL | Age: 34
Discharge: HOME | End: 2024-05-04
Attending: EMERGENCY MEDICINE
Payer: COMMERCIAL

## 2024-05-04 VITALS
TEMPERATURE: 98.2 F | HEART RATE: 83 BPM | SYSTOLIC BLOOD PRESSURE: 187 MMHG | DIASTOLIC BLOOD PRESSURE: 131 MMHG | RESPIRATION RATE: 18 BRPM | OXYGEN SATURATION: 98 %

## 2024-05-04 DIAGNOSIS — M67.90 TENDINOPATHY: Primary | ICD-10-CM

## 2024-05-04 PROCEDURE — 99284 EMERGENCY DEPT VISIT MOD MDM: CPT | Performed by: EMERGENCY MEDICINE

## 2024-05-04 PROCEDURE — 73630 X-RAY EXAM OF FOOT: CPT

## 2024-05-04 PROCEDURE — 96372 THER/PROPH/DIAG INJ SC/IM: CPT

## 2024-05-04 PROCEDURE — 99283 EMERGENCY DEPT VISIT LOW MDM: CPT

## 2024-05-04 PROCEDURE — 73610 X-RAY EXAM OF ANKLE: CPT

## 2024-05-04 RX ORDER — KETOROLAC TROMETHAMINE 30 MG/ML
15 INJECTION, SOLUTION INTRAMUSCULAR; INTRAVENOUS ONCE
Status: COMPLETED | OUTPATIENT
Start: 2024-05-04 | End: 2024-05-04

## 2024-05-04 RX ADMIN — KETOROLAC TROMETHAMINE 15 MG: 30 INJECTION, SOLUTION INTRAMUSCULAR; INTRAVENOUS at 10:29

## 2024-05-04 NOTE — DISCHARGE INSTRUCTIONS
You were seen in the ED for left ankle pain.  This peers to be a tendinopathy of your left Achilles tendon.  Please follow-up with podiatry, referral placed.    You were given Ace wrap and crutches.  Staying nonweightbearing will likely help with your symptoms along with Tylenol and ibuprofen for pain.

## 2024-05-04 NOTE — ED PROVIDER NOTES
History  Chief Complaint   Patient presents with    Ankle Pain     Pt has c/o L ankle pain for 3 days.  Pt states it has been happening for a couple years in different spots.     HPI  Patient is 33-year-old male with past medical history of DAVID, prediabetic, right renal agenesis presenting to ED for left ankle pain.  Patient reports 3 days of left ankle pain at posterior aspect worse with walking, but able to walk.  Patient reports has had similar pain intermittently for years.  Patient became concerned after finding out he only had 1 kidney that this may be related to gout.  Patient also reports intermittent left great toe pain.  Patient denies injury/trauma, knee pain, calf pain.  Patient denies fever, chills, other complaints  Prior to Admission Medications   Prescriptions Last Dose Informant Patient Reported? Taking?   albuterol (Proventil HFA) 90 mcg/act inhaler  Self No No   Sig: Inhale 2 puffs every 6 (six) hours as needed for wheezing   metFORMIN (GLUCOPHAGE-XR) 500 mg 24 hr tablet   No No   Sig: Take 1 tablet (500 mg total) by mouth 2 (two) times a day with meals      Facility-Administered Medications: None       Past Medical History:   Diagnosis Date    Allergic     Seasonal    Asthma 1999    Sleep apnea        Past Surgical History:   Procedure Laterality Date    MANDIBLE SURGERY      [place]    [place] VASECTOMY UNI/BI SPX W/POSTOP SEMEN EXAMS Right 1/13/2023    Procedure: RIGHT  SCROTUM EXPLORATION;  Surgeon: Sancho Ritchie MD;  Location: [facility] MAIN OR;  Service: Urology    WISDOM TOOTH EXTRACTION         Family History   Problem Relation Age of Onset    Diabetes type II Mother     Hypertension Mother     Diabetes Mother         Type 2    Heart disease Father     Diabetes type I Maternal Grandmother     Diabetes Maternal Grandmother         Type 1     I have reviewed and agree with the history as documented.    E-Cigarette/Vaping    E-Cigarette Use Never User      E-Cigarette/Vaping Substances     Nicotine No     THC No     CBD No     Flavoring No     Other No     Unknown No      Social History     Tobacco Use    Smoking status: Never    Smokeless tobacco: Never   Vaping Use    Vaping status: Never Used   Substance Use Topics    Alcohol use: Yes     Comment: Rarely    Drug use: Never        Review of Systems   Constitutional:  Negative for chills and fever.   Musculoskeletal:         Left ankle pain with   Skin:  Negative for rash and wound.       Physical Exam  ED Triage Vitals [05/04/24 0958]   Temperature Pulse Respirations Blood Pressure SpO2   98.2 °F (36.8 °C) 83 18 (!) 187/131 98 %      Temp Source Heart Rate Source Patient Position - Orthostatic VS BP Location FiO2 (%)   Temporal Monitor Sitting Right arm --      Pain Score       9             Orthostatic Vital Signs  Vitals:    05/04/24 0958   BP: (!) 187/131   Pulse: 83   Patient Position - Orthostatic VS: Sitting       Physical Exam  Vitals and nursing note reviewed.   Constitutional:       Appearance: Normal appearance.   HENT:      Head: Normocephalic and atraumatic.      Mouth/Throat:      Mouth: Mucous membranes are moist.   Eyes:      Conjunctiva/sclera: Conjunctivae normal.   Cardiovascular:      Rate and Rhythm: Normal rate.   Pulmonary:      Effort: Pulmonary effort is normal. No respiratory distress.   Musculoskeletal:      Comments: Tender to palpation at left Achilles tendon insertion. Negative Medina test.  No lateral medial malleolus tenderness to palpation.  No tenderness at the base of the fifth metatarsal or proximal fibular head.  5/5 strength with plantar and dorsiflexion. 2+ DP and PT   Skin:     General: Skin is warm and dry.      Capillary Refill: Capillary refill takes less than 2 seconds.   Neurological:      Mental Status: He is alert and oriented to person, place, and time.         ED Medications  Medications   ketorolac (TORADOL) injection 15 mg (15 mg Intramuscular Given 5/4/24 1029)       Diagnostic Studies  Results  Reviewed       None                   XR foot 3+ views LEFT   ED Interpretation by Livier Riojas DO (05/04 1108)   No acute osseous abnormality      XR ankle 3+ views LEFT   ED Interpretation by Livier Riojas DO (05/04 1108)   No acute osseous abnormality            Procedures  Procedures      ED Course                             SBIRT 20yo+      Flowsheet Row Most Recent Value   Initial Alcohol Screen: US AUDIT-C     1. How often do you have a drink containing alcohol? 0 Filed at: 05/04/2024 1000   2. How many drinks containing alcohol do you have on a typical day you are drinking?  0 Filed at: 05/04/2024 1000   3a. Male UNDER 65: How often do you have five or more drinks on one occasion? 0 Filed at: 05/04/2024 1000   3b. FEMALE Any Age, or MALE 65+: How often do you have 4 or more drinks on one occassion? 0 Filed at: 05/04/2024 1000   Audit-C Score 0 Filed at: 05/04/2024 1000   LYNN: How many times in the past year have you...    Used an illegal drug or used a prescription medication for non-medical reasons? Never Filed at: 05/04/2024 1000                  Medical Decision Making  Amount and/or Complexity of Data Reviewed  Radiology: ordered and independent interpretation performed.    Risk  Prescription drug management.        Patient is 33 y.o. male with PMH of DAVID, prediabetic, right renal agenesis presenting to ED for left ankle pain.  . See history and physical documented above.     Differential diagnosis included but not limited to tendinopathy, gout, ankle sprain. Plan xray left foot and ankle, IM toradol.    View ED course above for further discussion on patient workup.       All labs reviewed and utilized in the medical decision making process  All radiology studies independently viewed by me and interpreted by the radiologist.  I reviewed all testing with the patient.     Upon re-evaluation patient main stable.  Discussed likelihood of tendinopathy of Achilles tendon.  Recommended rest, Tylenol  Motrin for pain and follow-up with podiatry.  Patient amenable to plan.    I have reviewed the patient's vital signs, nursing notes, and other relevant tests/information. I had a detailed discussion with the patient regarding the history, exam findings, and any diagnostic results.   Plan to discharge home in stable condition with achilles tendinopathy, follow up with podiatry.  Discussed with patient who is agreeable to plan.  I discussed discharge instructions, need for follow-up, and oral return precautions for what to return for in addition to the written return precautions and discharge instructions, specifically highlighting areas of special concern.  All questions the patient had were answered prior to discharge to the best of my ability.       Disposition  Final diagnoses:   Tendinopathy(achilles)     Time reflects when diagnosis was documented in both MDM as applicable and the Disposition within this note       Time User Action Codes Description Comment    5/4/2024 11:12 AM Livier Riojas Add [M67.90] Tendinopathy     5/4/2024 11:12 AM Livier Riojas Modify [M67.90] Tendinopathy(achilles)           ED Disposition       ED Disposition   Discharge    Condition   Stable    Date/Time   Sat May 4, 2024 1111    Comment   Dieter Camargo discharge to home/self care.                   Follow-up Information    None         Patient's Medications   Discharge Prescriptions    No medications on file         PDMP Review       None             ED Provider  Attending physically available and evaluated Dieter Camargo. I managed the patient along with the ED Attending.    Electronically Signed by           Livier Riojas DO  05/04/24 1124

## 2024-05-04 NOTE — ED ATTENDING ATTESTATION
"I, Chava Shaffer MD, saw and evaluated the patient. I have discussed the patient with the resident and agree with the resident's findings, Plan of Care, and MDM as documented in the resident's note, except where noted. All available labs and Radiology studies were reviewed.  I was present for key portions of any procedure(s) performed by the resident and I was immediately available to provide assistance.    At this point I agree with the current assessment done in the Emergency Department.  I have conducted an independent evaluation of this patient a history and physical is as follows:    32 yo morbidly obese male with a history of hyperlipidemia, DAVID, and prediabetes presents to the ED for evaluation of left ankle pain. The patient reports intermittent pain in the foot and ankle for the past 5 years. He says the pain became more frequent and worse after undergoing a vasectomy 2 years ago. Over the past 3 days he has been experiencing an \"ache\" in his left posterior foot and ankle. Pain is worse with ambulation/standing and improved with rest. He is able to walk with some discomfort. He is also experiencing occasional pain in the left great toe. No redness, swelling, or warmth. He cannot recall any recent injury or trauma to the foot or ankle. No fevers or chills. The patient is concerned that he has gout. No calf pain or swelling. No other specific complaints.    ROS: per resident physician note    Gen: NAD, AA&Ox3  HEENT: PERRL, EOMI  Neck: supple  CV: RRR  Lungs: CTA B/L  Abdomen: soft, NT/ND  Left Foot/Anlle: no swelling or deformity, (+) ttp over left posterior calcaneous, achilles tendon intact, (+) FROM, DP/PT pulses intact, no erythema/warmth  Neuro: 5/5 strength all extremities, sensation grossly intact  Skin: no rash    ED Course  The patient is well appearing with stable vital signs and a benign LLE examination. No clinical concern for acute gouty flare or septic arthritis. Low clinical suspicion for " bony injury. Tendinopathy vs sprain? Will check x-rays of the foot and ankle. Toradol administered, will continue to monitor in the ED. Disposition per imaging and reassessment. Likely plan for discharge to home with supportive care and ortho/podiatry follow up as needed. The patient is agreeable to this plan.      Critical Care Time  Procedures

## 2024-05-06 ENCOUNTER — OFFICE VISIT (OUTPATIENT)
Dept: FAMILY MEDICINE CLINIC | Facility: CLINIC | Age: 34
End: 2024-05-06
Payer: COMMERCIAL

## 2024-05-06 VITALS — HEART RATE: 61 BPM | BODY MASS INDEX: 38.4 KG/M2 | OXYGEN SATURATION: 98 % | WEIGHT: 260 LBS | TEMPERATURE: 96.8 F

## 2024-05-06 DIAGNOSIS — M25.472 PAIN AND SWELLING OF LEFT ANKLE: ICD-10-CM

## 2024-05-06 DIAGNOSIS — Z13.0 ENCOUNTER FOR SCREENING FOR HEMATOLOGIC DISORDER: ICD-10-CM

## 2024-05-06 DIAGNOSIS — M76.62 ACHILLES TENDINITIS OF LEFT LOWER EXTREMITY: Primary | ICD-10-CM

## 2024-05-06 DIAGNOSIS — M25.572 PAIN AND SWELLING OF LEFT ANKLE: ICD-10-CM

## 2024-05-06 DIAGNOSIS — E78.2 MIXED HYPERLIPIDEMIA: ICD-10-CM

## 2024-05-06 DIAGNOSIS — R73.03 PREDIABETES: ICD-10-CM

## 2024-05-06 PROCEDURE — 99213 OFFICE O/P EST LOW 20 MIN: CPT | Performed by: NURSE PRACTITIONER

## 2024-05-06 RX ORDER — PREDNISONE 20 MG/1
TABLET ORAL
Qty: 15 TABLET | Refills: 0 | Status: SHIPPED | OUTPATIENT
Start: 2024-05-06

## 2024-05-06 NOTE — PROGRESS NOTES
[facility]        NAME: Dieter Camargo is a 33 y.o. male  : 1990    MRN: [id number]  DATE: May 7, 2024  TIME: 7:42 AM    Assessment and Plan   Achilles tendinitis of left lower extremity [M76.62]  1. Achilles tendinitis of left lower extremity  Diclofenac Sodium (VOLTAREN) 1 %    predniSONE 20 mg tablet      2. Pain and swelling of left ankle  Uric acid      3. Prediabetes  Comprehensive metabolic panel    Hemoglobin A1C      4. Mixed hyperlipidemia  Lipid panel      5. Encounter for screening for hematologic disorder  CBC and Platelet            Patient Instructions     Patient Instructions   Prednisone as ordered for foot pain/inflammation  F/up with podiatry as scheduled  Fasting labs as ordered. Return for annual physical after labs completed        Chief Complaint     Chief Complaint   Patient presents with    Follow-up     Left ankle    Anxiety         History of Present Illness       Patient is a 33 y.o. male with a past medical history of DAVID, prediabetic, right renal agenesis -he C/o left heel/ankle pain and swelling. He was sen in the ED-Xray was normal. He has an appointment scheduled with podiatry next week. He is due physical and labs.     He states his wife is concerned with his anxiety-patient states he does not feel anxious everyday and does not feel the need to be on medications. He states he has 3 young boys and it becomes overwhelming at times but he states he has been able to control his stress and anxiety by just taking a few minutes alone. He states he will bring his wife to next appointment to discuss her concerns.        Review of Systems   Review of Systems      Current Medications       Current Outpatient Medications:     albuterol (Proventil HFA) 90 mcg/act inhaler, Inhale 2 puffs every 6 (six) hours as needed for wheezing, Disp: 6.7 g, Rfl: 5    Diclofenac Sodium (VOLTAREN) 1 %, Apply 2 g topically 4 (four) times a day, Disp: 100 g, Rfl: 1    metFORMIN  (GLUCOPHAGE-XR) 500 mg 24 hr tablet, Take 1 tablet (500 mg total) by mouth 2 (two) times a day with meals, Disp: 180 tablet, Rfl: 3    predniSONE 20 mg tablet, Take one tablet twice a day x 5 days, then take one tablet daily x 5 days., Disp: 15 tablet, Rfl: 0    Current Allergies     Allergies as of 05/06/2024    (No Known Allergies)            The following portions of the patient's history were reviewed and updated as appropriate: allergies, current medications, past family history, past medical history, past social history, past surgical history and problem list.     Past Medical History:   Diagnosis Date    Allergic     Seasonal    Asthma 1999    Sleep apnea        Past Surgical History:   Procedure Laterality Date    MANDIBLE SURGERY      [place]    [place] VASECTOMY UNI/BI SPX W/POSTOP SEMEN EXAMS Right 1/13/2023    Procedure: RIGHT  SCROTUM EXPLORATION;  Surgeon: Sancho Ritchie MD;  Location: [facility] MAIN OR;  Service: Urology    WISDOM TOOTH EXTRACTION         Family History   Problem Relation Age of Onset    Diabetes type II Mother     Hypertension Mother     Diabetes Mother         Type 2    Heart disease Father     Diabetes type I Maternal Grandmother     Diabetes Maternal Grandmother         Type 1         Medications have been verified.        Objective   Pulse 61   Temp (!) 96.8 °F (36 °C) (Tympanic)   Wt 118 kg (260 lb)   SpO2 98%   BMI 38.40 kg/m²        Physical Exam     Physical Exam  Vitals and nursing note reviewed.   Constitutional:       Appearance: Normal appearance.   Neurological:      Mental Status: He is alert.

## 2024-05-07 NOTE — PATIENT INSTRUCTIONS
Prednisone as ordered for foot pain/inflammation  F/up with podiatry as scheduled  Fasting labs as ordered. Return for annual physical after labs completed

## 2024-05-15 ENCOUNTER — APPOINTMENT (OUTPATIENT)
Dept: LAB | Facility: CLINIC | Age: 34
End: 2024-05-15
Payer: COMMERCIAL

## 2024-05-15 ENCOUNTER — OFFICE VISIT (OUTPATIENT)
Dept: PODIATRY | Facility: CLINIC | Age: 34
End: 2024-05-15
Payer: COMMERCIAL

## 2024-05-15 VITALS
HEIGHT: 69 IN | SYSTOLIC BLOOD PRESSURE: 159 MMHG | BODY MASS INDEX: 38.95 KG/M2 | WEIGHT: 263 LBS | HEART RATE: 76 BPM | DIASTOLIC BLOOD PRESSURE: 82 MMHG

## 2024-05-15 DIAGNOSIS — M25.572 PAIN AND SWELLING OF LEFT ANKLE: ICD-10-CM

## 2024-05-15 DIAGNOSIS — Z13.0 ENCOUNTER FOR SCREENING FOR HEMATOLOGIC DISORDER: ICD-10-CM

## 2024-05-15 DIAGNOSIS — M76.62 ACHILLES TENDINITIS, LEFT LEG: Primary | ICD-10-CM

## 2024-05-15 DIAGNOSIS — E78.2 MIXED HYPERLIPIDEMIA: ICD-10-CM

## 2024-05-15 DIAGNOSIS — R73.03 PREDIABETES: ICD-10-CM

## 2024-05-15 DIAGNOSIS — M25.572 ACUTE LEFT ANKLE PAIN: ICD-10-CM

## 2024-05-15 DIAGNOSIS — M25.472 PAIN AND SWELLING OF LEFT ANKLE: ICD-10-CM

## 2024-05-15 LAB
ALBUMIN SERPL BCP-MCNC: 4.3 G/DL (ref 3.5–5)
ALP SERPL-CCNC: 86 U/L (ref 34–104)
ALT SERPL W P-5'-P-CCNC: 31 U/L (ref 7–52)
ANION GAP SERPL CALCULATED.3IONS-SCNC: 9 MMOL/L (ref 4–13)
AST SERPL W P-5'-P-CCNC: 18 U/L (ref 13–39)
BILIRUB SERPL-MCNC: 0.48 MG/DL (ref 0.2–1)
BUN SERPL-MCNC: 24 MG/DL (ref 5–25)
CALCIUM SERPL-MCNC: 9.7 MG/DL (ref 8.4–10.2)
CHLORIDE SERPL-SCNC: 102 MMOL/L (ref 96–108)
CHOLEST SERPL-MCNC: 259 MG/DL
CO2 SERPL-SCNC: 26 MMOL/L (ref 21–32)
CREAT SERPL-MCNC: 1 MG/DL (ref 0.6–1.3)
ERYTHROCYTE [DISTWIDTH] IN BLOOD BY AUTOMATED COUNT: 13.2 % (ref 11.6–15.1)
EST. AVERAGE GLUCOSE BLD GHB EST-MCNC: 111 MG/DL
GFR SERPL CREATININE-BSD FRML MDRD: 97 ML/MIN/1.73SQ M
GLUCOSE P FAST SERPL-MCNC: 80 MG/DL (ref 65–99)
HBA1C MFR BLD: 5.5 %
HCT VFR BLD AUTO: 43.2 % (ref 36.5–49.3)
HDLC SERPL-MCNC: 45 MG/DL
HGB BLD-MCNC: 14.3 G/DL (ref 12–17)
MCH RBC QN AUTO: 29.5 PG (ref 26.8–34.3)
MCHC RBC AUTO-ENTMCNC: 33.1 G/DL (ref 31.4–37.4)
MCV RBC AUTO: 89 FL (ref 82–98)
NONHDLC SERPL-MCNC: 214 MG/DL
PLATELET # BLD AUTO: 229 THOUSANDS/UL (ref 149–390)
PMV BLD AUTO: 11.4 FL (ref 8.9–12.7)
POTASSIUM SERPL-SCNC: 4.4 MMOL/L (ref 3.5–5.3)
PROT SERPL-MCNC: 7.3 G/DL (ref 6.4–8.4)
RBC # BLD AUTO: 4.85 MILLION/UL (ref 3.88–5.62)
SODIUM SERPL-SCNC: 137 MMOL/L (ref 135–147)
TRIGL SERPL-MCNC: 425 MG/DL
URATE SERPL-MCNC: 8.2 MG/DL (ref 3.5–8.5)
WBC # BLD AUTO: 9.89 THOUSAND/UL (ref 4.31–10.16)

## 2024-05-15 PROCEDURE — 84550 ASSAY OF BLOOD/URIC ACID: CPT

## 2024-05-15 PROCEDURE — 80061 LIPID PANEL: CPT

## 2024-05-15 PROCEDURE — 83036 HEMOGLOBIN GLYCOSYLATED A1C: CPT

## 2024-05-15 PROCEDURE — 99243 OFF/OP CNSLTJ NEW/EST LOW 30: CPT | Performed by: PODIATRIST

## 2024-05-15 PROCEDURE — 80053 COMPREHEN METABOLIC PANEL: CPT

## 2024-05-15 PROCEDURE — 36415 COLL VENOUS BLD VENIPUNCTURE: CPT

## 2024-05-15 PROCEDURE — 85027 COMPLETE CBC AUTOMATED: CPT

## 2024-05-15 NOTE — PATIENT INSTRUCTIONS
Achilles Tendinitis   WHAT YOU NEED TO KNOW:   What is Achilles tendinitis?  Achilles tendinitis is swelling of the tendon that connects your calf muscle to your heel bone. It may happen suddenly or become a chronic condition. Your risk for Achilles tendinitis increases as you age.   What causes Achilles tendinitis?   Overuse of your leg muscles    A sudden increase in the amount or intensity of an activity    Jumping or running on hard or uneven surfaces    Wearing shoes that do not fit or support your foot and ankle    Tight calf muscles    A bone spur where your Achilles tendon attaches to your heel    Medicines such as steroids or antibiotics    What are the signs and symptoms of Achilles tendinitis?   Pain in your heel that gets worse with activity    Swelling in your heel or calf    Stiffness in your heel or calf    How is Achilles tendinitis diagnosed?  Your healthcare provider will examine your heel and calf for swelling. Your provider may move your foot or ankle and ask if you have pain. Tell your provider when your symptoms started and which activities make the pain worse. You may need an x-ray, ultrasound, or MRI to check for bone spurs or tears in your Achilles tendon. Do not enter the MRI room with anything metal. Metal can cause serious injury. Tell the healthcare provider if you have any metal in or on your body.  How is Achilles tendinitis treated?   Medicines  may be given to decrease pain and swelling.    Support devices  may include a splint, orthotic, or brace. These devices will decrease pressure on your Achilles tendon and help relieve pain.    Physical therapy  may be needed. A physical therapist teaches you exercises to help improve movement and strength, and decrease pain. You may need to practice exercises at home.    Surgery and other procedures  may be needed if other treatments do not work. Surgery may be done to repair a tear in the tendon, or to remove parts of the tendon. Other  procedures may include an injection of platelets or red blood cells into your Achilles tendon. It may also include therapy with electrical currents to treat swelling and pain.    What can I do to manage Achilles tendinitis?   Rest  as directed. Rest decreases swelling and prevents your tendinitis from getting worse. Your healthcare provider may tell you to stop your usual training or exercise activities. Ask when you can return to your normal activities or exercise plan.    Apply ice  on your Achilles tendon for 15 to 20 minutes every hour or as directed. Use an ice pack, or put crushed ice in a plastic bag. Cover it with a towel. Ice helps prevent tissue damage and decreases swelling and pain.    Wear a compression bandage or use tape  as directed. This will decrease swelling and pain. Ask your healthcare provider how to wrap a compression bandage or apply tape. If you use a support device ask if you should wear a compression bandage or use tape.    Elevate  your heel above the level of your heart as often as you can. This will help decrease swelling and pain. Prop your heel on pillows or blankets to keep it elevated comfortably.    Stretch  as directed when you return to your exercise program. Always warm up your muscles and stretch before you exercise. Do cool down exercises and stretches when you are finished. This will keep your muscles loose and decrease stress on your Achilles tendon.    Do bilateral heel drop exercises as directed.  Bilateral heel drops strengthen your Achilles tendon. Do not do the following exercise unless your healthcare provider says it is safe:    Stand at the edge of a stair or raised step. Hold onto the railing for balance.    Place the front part of your foot on the stair or step. Let the back of your foot hang off of the stair or step.    Slowly lift your heels off the ground and then slowly lower your heels past the stair. Do not move your heels quickly.  This could make your  injury worse. Repeat this exercise 20 times or as directed.       Slowly increase the time and intensity when you return to your exercise program.  Start with short and low intensity exercises. Ask your healthcare provider how and when to increase the time and intensity of your exercise.    When should I contact my healthcare provider?   You have a fever.    Your swelling or pain gets worse.    You feel or hear a sudden pop near your ankle.    You cannot bend your ankle or put pressure on your leg.    You have questions about your condition or care.    CARE AGREEMENT:   You have the right to help plan your care. Learn about your health condition and how it may be treated. Discuss treatment options with your healthcare providers to decide what care you want to receive. You always have the right to refuse treatment. The above information is an  only. It is not intended as medical advice for individual conditions or treatments. Talk to your doctor, nurse or pharmacist before following any medical regimen to see if it is safe and effective for you.  © Copyright [employer] 2023 Information is for End User's use only and may not be sold, redistributed or otherwise used for commercial purposes.

## 2024-05-15 NOTE — LETTER
May 16, 2024     Chava Shaffer MD  [address] [place] [id number]    Patient: Dieter Camargo   YOB: 1990   Date of Visit: 5/15/2024       Dear Dr. Shaffer:    Thank you for referring Dieter Camargo to me for evaluation. Below are my notes for this consultation.    If you have questions, please do not hesitate to call me. I look forward to following your patient along with you.         Sincerely,        Charly Rodríguez DPM        CC: No Recipients    Charly Rodríguez DPM  5/16/2024  8:44 AM  Signed                 PATIENT:  Dieter Camargo  1990       ASSESSMENT:     1. Achilles tendinitis, left leg  Ambulatory Referral to Podiatry    Cam Boot    Ambulatory referral to Physical Therapy      2. Acute left ankle pain  Ambulatory referral to Physical Therapy                PLAN:  1. Reviewed medical records.  Reviewed the note from ED.  Patient was counseled and educated on the condition and the diagnosis.    2. X-ray was personally reviewed.  The radiological findings were discussed with the patient.    3. The exam and symptoms are consistent with achilles tendinitis at the insertion to heel.  The diagnosis, treatment options and prognosis were discussed with the patient.    4. Referred him to PT.  Start him on CAM boot.  Instructed supportive care, home exercise, icing, and resting.    5. He had periodic foot pain in the last few years and I agree with obtaining uric acid level.    6. Patient will return in 6 weeks for re-evaluation.       Imaging: I have personally reviewed pertinent films in PACS  Labs, pathology, and Other Studies: I have personally reviewed pertinent reports.        Subjective:     HPI  The patient was referred to my office for evaluation of left foot / ankle pain.  He has pain for about 3 weeks.  Pain is around left posterior heel and ankle.  Pain increases with walking and standing.  The patient does not recall any injury.  No associated numbness or paresthesia.  No  significant weakness or dysfunction. He also reported he had periodic foot pain in the last few years.  Usually it lasts for a couple of days, but this time his pain was not getting better.        The following portions of the patient's history were reviewed and updated as appropriate: allergies, current medications, past family history, past medical history, past social history, past surgical history and problem list.  All pertinent labs and images were reviewed.      Past Medical History  Past Medical History:   Diagnosis Date   • Allergic     Seasonal   • Asthma 1999   • Sleep apnea        Past Surgical History  Past Surgical History:   Procedure Laterality Date   • MANDIBLE SURGERY      [place]   • [place] VASECTOMY UNI/BI SPX W/POSTOP SEMEN EXAMS Right 1/13/2023    Procedure: RIGHT  SCROTUM EXPLORATION;  Surgeon: Sancho Ritchie MD;  [facility] MA[facility];  Service: Urology   • WISDOM TOOTH EXTRACTION          Allergies:  Patient has no known allergies.    Medications:  Current Outpatient Medications   Medication Sig Dispense Refill   • albuterol (Proventil HFA) 90 mcg/act inhaler Inhale 2 puffs every 6 (six) hours as needed for wheezing 6.7 g 5   • Diclofenac Sodium (VOLTAREN) 1 % Apply 2 g topically 4 (four) times a day 100 g 1   • metFORMIN (GLUCOPHAGE-XR) 500 mg 24 hr tablet Take 1 tablet (500 mg total) by mouth 2 (two) times a day with meals 180 tablet 3   • predniSONE 20 mg tablet Take one tablet twice a day x 5 days, then take one tablet daily x 5 days. 15 tablet 0     No current facility-administered medications for this visit.       Social History:  Social History     Socioeconomic History   • Marital status: /Civil Union     Spouse name: None   • Number of children: None   • Years of education: None   • Highest education level: None   Occupational History   • None   Tobacco Use   • Smoking status: Never   • Smokeless tobacco: Never   Vaping Use   • Vaping status: Never Used   Substance and  "Sexual Activity   • Alcohol use: Yes     Comment: Rarely   • Drug use: Never   • Sexual activity: Yes     Partners: Female   Other Topics Concern   • None   Social History Narrative   • None     Social Determinants of Health     Financial Resource Strain: Not on file   Food Insecurity: Not on file   Transportation Needs: Not on file   Physical Activity: Not on file   Stress: Not on file   Social Connections: Not on file   Intimate Partner Violence: Not on file   Housing Stability: Not on file          Review of Systems   Constitutional:  Negative for chills and fever.   Respiratory:  Negative for cough and shortness of breath.    Cardiovascular:  Negative for chest pain and leg swelling.   Gastrointestinal:  Negative for nausea and vomiting.   Skin:  Negative for wound.   Allergic/Immunologic: Negative for immunocompromised state.   Neurological:  Negative for weakness and numbness.   Hematological: Negative.    Psychiatric/Behavioral:  Negative for behavioral problems and confusion.          Objective:      /82   Pulse 76   Ht 5' 9\" (1.753 m)   Wt 119 kg (263 lb)   BMI 38.84 kg/m²          Physical Exam  Vitals reviewed.   Constitutional:       General: He is not in acute distress.     Appearance: He is not toxic-appearing or diaphoretic.   HENT:      Head: Normocephalic and atraumatic.   Eyes:      Extraocular Movements: Extraocular movements intact.   Cardiovascular:      Rate and Rhythm: Normal rate and regular rhythm.      Pulses: Normal pulses.           Dorsalis pedis pulses are 2+ on the right side and 2+ on the left side.        Posterior tibial pulses are 2+ on the right side and 2+ on the left side.   Pulmonary:      Effort: Pulmonary effort is normal. No respiratory distress.   Musculoskeletal:         General: Tenderness present. No signs of injury.      Cervical back: Normal range of motion and neck supple.      Right lower leg: No edema.      Left lower leg: No edema.      Right foot: No " foot drop.      Left foot: No foot drop.      Comments: Focal tenderness left posterior heel and distal achilles tendon.  Tendon function is WNL.  No achilles tendon defect.  No other joint pain, redness, warmth, or swelling.     Skin:     General: Skin is warm.      Capillary Refill: Capillary refill takes less than 2 seconds.      Coloration: Skin is not cyanotic or mottled.      Findings: No abscess or wound.      Nails: There is no clubbing.   Neurological:      General: No focal deficit present.      Mental Status: He is alert and oriented to person, place, and time.      Cranial Nerves: No cranial nerve deficit.      Sensory: No sensory deficit.      Motor: No weakness.      Coordination: Coordination normal.   Psychiatric:         Mood and Affect: Mood normal.         Behavior: Behavior normal.         Thought Content: Thought content normal.         Judgment: Judgment normal.

## 2024-05-15 NOTE — PROGRESS NOTES
PATIENT:  Dieter Camargo  1990       ASSESSMENT:     1. Achilles tendinitis, left leg  Ambulatory Referral to Podiatry    Cam Boot    Ambulatory referral to Physical Therapy      2. Acute left ankle pain  Ambulatory referral to Physical Therapy                PLAN:  1. Reviewed medical records.  Reviewed the note from ED.  Patient was counseled and educated on the condition and the diagnosis.    2. X-ray was personally reviewed.  The radiological findings were discussed with the patient.    3. The exam and symptoms are consistent with achilles tendinitis at the insertion to heel.  The diagnosis, treatment options and prognosis were discussed with the patient.    4. Referred him to PT.  Start him on CAM boot.  Instructed supportive care, home exercise, icing, and resting.    5. He had periodic foot pain in the last few years and I agree with obtaining uric acid level.    6. Patient will return in 6 weeks for re-evaluation.       Imaging: I have personally reviewed pertinent films in PACS  Labs, pathology, and Other Studies: I have personally reviewed pertinent reports.        Subjective:     HPI  The patient was referred to my office for evaluation of left foot / ankle pain.  He has pain for about 3 weeks.  Pain is around left posterior heel and ankle.  Pain increases with walking and standing.  The patient does not recall any injury.  No associated numbness or paresthesia.  No significant weakness or dysfunction. He also reported he had periodic foot pain in the last few years.  Usually it lasts for a couple of days, but this time his pain was not getting better.        The following portions of the patient's history were reviewed and updated as appropriate: allergies, current medications, past family history, past medical history, past social history, past surgical history and problem list.  All pertinent labs and images were reviewed.      Past Medical History  Past Medical History:    Diagnosis Date    Allergic     Seasonal    Asthma 1999    Sleep apnea        Past Surgical History  Past Surgical History:   Procedure Laterality Date    MANDIBLE SURGERY      [place]    [place] VASECTOMY UNI/BI SPX W/POSTOP SEMEN EXAMS Right 1/13/2023    Procedure: RIGHT  SCROTUM EXPLORATION;  Surgeon: Sancho Ritchie MD;  Location: [facility] MAIN OR;  Service: Urology    WISDOM TOOTH EXTRACTION          Allergies:  Patient has no known allergies.    Medications:  Current Outpatient Medications   Medication Sig Dispense Refill    albuterol (Proventil HFA) 90 mcg/act inhaler Inhale 2 puffs every 6 (six) hours as needed for wheezing 6.7 g 5    Diclofenac Sodium (VOLTAREN) 1 % Apply 2 g topically 4 (four) times a day 100 g 1    metFORMIN (GLUCOPHAGE-XR) 500 mg 24 hr tablet Take 1 tablet (500 mg total) by mouth 2 (two) times a day with meals 180 tablet 3    predniSONE 20 mg tablet Take one tablet twice a day x 5 days, then take one tablet daily x 5 days. 15 tablet 0     No current facility-administered medications for this visit.       Social History:  Social History     Socioeconomic History    Marital status: /Civil Union     Spouse name: None    Number of children: None    Years of education: None    Highest education level: None   Occupational History    None   Tobacco Use    Smoking status: Never    Smokeless tobacco: Never   Vaping Use    Vaping status: Never Used   Substance and Sexual Activity    Alcohol use: Yes     Comment: Rarely    Drug use: Never    Sexual activity: Yes     Partners: Female   Other Topics Concern    None   Social History Narrative    None     Social Determinants of Health     Financial Resource Strain: Not on file   Food Insecurity: Not on file   Transportation Needs: Not on file   Physical Activity: Not on file   Stress: Not on file   Social Connections: Not on file   Intimate Partner Violence: Not on file   Housing Stability: Not on file          Review of Systems   Constitutional:  " Negative for chills and fever.   Respiratory:  Negative for cough and shortness of breath.    Cardiovascular:  Negative for chest pain and leg swelling.   Gastrointestinal:  Negative for nausea and vomiting.   Skin:  Negative for wound.   Allergic/Immunologic: Negative for immunocompromised state.   Neurological:  Negative for weakness and numbness.   Hematological: Negative.    Psychiatric/Behavioral:  Negative for behavioral problems and confusion.          Objective:      /82   Pulse 76   Ht 5' 9\" (1.753 m)   Wt 119 kg (263 lb)   BMI 38.84 kg/m²          Physical Exam  Vitals reviewed.   Constitutional:       General: He is not in acute distress.     Appearance: He is not toxic-appearing or diaphoretic.   HENT:      Head: Normocephalic and atraumatic.   Eyes:      Extraocular Movements: Extraocular movements intact.   Cardiovascular:      Rate and Rhythm: Normal rate and regular rhythm.      Pulses: Normal pulses.           Dorsalis pedis pulses are 2+ on the right side and 2+ on the left side.        Posterior tibial pulses are 2+ on the right side and 2+ on the left side.   Pulmonary:      Effort: Pulmonary effort is normal. No respiratory distress.   Musculoskeletal:         General: Tenderness present. No signs of injury.      Cervical back: Normal range of motion and neck supple.      Right lower leg: No edema.      Left lower leg: No edema.      Right foot: No foot drop.      Left foot: No foot drop.      Comments: Focal tenderness left posterior heel and distal achilles tendon.  Tendon function is WNL.  No achilles tendon defect.  No other joint pain, redness, warmth, or swelling.     Skin:     General: Skin is warm.      Capillary Refill: Capillary refill takes less than 2 seconds.      Coloration: Skin is not cyanotic or mottled.      Findings: No abscess or wound.      Nails: There is no clubbing.   Neurological:      General: No focal deficit present.      Mental Status: He is alert and " oriented to person, place, and time.      Cranial Nerves: No cranial nerve deficit.      Sensory: No sensory deficit.      Motor: No weakness.      Coordination: Coordination normal.   Psychiatric:         Mood and Affect: Mood normal.         Behavior: Behavior normal.         Thought Content: Thought content normal.         Judgment: Judgment normal.

## 2024-05-17 DIAGNOSIS — E78.2 MIXED HYPERLIPIDEMIA: Primary | ICD-10-CM

## 2024-05-17 RX ORDER — ROSUVASTATIN CALCIUM 10 MG/1
10 TABLET, COATED ORAL DAILY
Qty: 90 TABLET | Refills: 1 | Status: SHIPPED | OUTPATIENT
Start: 2024-05-17

## 2024-05-28 ENCOUNTER — EVALUATION (OUTPATIENT)
Dept: PHYSICAL THERAPY | Facility: CLINIC | Age: 34
End: 2024-05-28
Payer: COMMERCIAL

## 2024-05-28 DIAGNOSIS — M76.62 ACHILLES TENDINITIS, LEFT LEG: Primary | ICD-10-CM

## 2024-05-28 DIAGNOSIS — M25.572 ACUTE LEFT ANKLE PAIN: ICD-10-CM

## 2024-05-28 PROCEDURE — 97161 PT EVAL LOW COMPLEX 20 MIN: CPT | Performed by: PHYSICAL THERAPIST

## 2024-05-28 PROCEDURE — 97112 NEUROMUSCULAR REEDUCATION: CPT | Performed by: PHYSICAL THERAPIST

## 2024-05-28 PROCEDURE — 97530 THERAPEUTIC ACTIVITIES: CPT | Performed by: PHYSICAL THERAPIST

## 2024-05-28 NOTE — PROGRESS NOTES
PT Evaluation     Today's date: 2024  Patient name: Dieter Camargo  : 1990  MRN: [id number]  Referring provider: Charly Rodríguez DPM  Dx:   Encounter Diagnosis     ICD-10-CM    1. Achilles tendinitis, left leg  M76.62 Ambulatory referral to Physical Therapy      2. Acute left ankle pain  M25.572 Ambulatory referral to Physical Therapy                     Assessment  Impairments: abnormal or restricted ROM, activity intolerance, impaired physical strength, pain with function and weight-bearing intolerance  Symptom irritability: moderate    Assessment details: Dieter Camargo is a 33 y.o. male presenting to outpatient physical therapy at [facility] with complaints of L achilles pain, stiffness and ankle weakness.  He presents with decreased range of motion, decreased strength, limited flexibility, poor postural awareness, poor body mechanics, altered gait pattern, poor balance, decreased tolerance to activity and decreased functional mobility due to Achilles tendinitis, left leg  Acute left ankle pain.  He would benefit from skilled PT services in order to address these deficits and reach maximum level of function.  Thank you for the referral!    Understanding of Dx/Px/POC: good     Prognosis: good    Goals  STG  1. Independent with HEP in 2 weeks  2. Decrease pain at worst by 50% in 2 weeks    LTG  1. Increase L ankle strength in all planes to 5/5 in 4 weeks  2. Return to full, pain-free and unrestricted walking and standing in 4 weeks        Plan  Patient would benefit from: PT eval and skilled physical therapy  Planned modality interventions: thermotherapy: hydrocollator packs    Planned therapy interventions: manual therapy, neuromuscular re-education, therapeutic activities and therapeutic exercise    Treatment plan discussed with: patient    Subjective Evaluation    History of Present Illness  Mechanism of injury: Pt reports chronic hx of L achilles tendonitis trying to edilberto his kids.   Quality of  "life: good    Patient Goals  Patient goal: be able to walk/edilberto kids with no pain  Pain  Current pain ratin  At best pain ratin  At worst pain rating: 10  Location: L superior and posterior calcaneal region  Quality: sharp and burning  Relieving factors: rest and ice  Aggravating factors: standing, walking and stair climbing  Progression: worsening      Diagnostic Tests  X-ray: normal    Objective     Tenderness   Left Ankle/Foot   Tenderness in the Achilles insertion.     Active Range of Motion   Left Ankle/Foot   Dorsiflexion (ke): 3 degrees with pain  Plantar flexion: 45 degrees with pain    Right Ankle/Foot   Dorsiflexion (ke): 5 degrees   Plantar flexion: 50 degrees     Strength/Myotome Testing     Left Ankle/Foot   Dorsiflexion: 4  Plantar flexion: 4    Right Ankle/Foot   Normal strength    SLS: RLE = 10 sec, LLE = unable due to pain         EPOC: 24      Manuals             IASM L Achilles                                                    Neuro Re-Ed             HR iso & ecc 3x15 8\", 3\"            SLS                                                                              Ther Ex             Slant board KE             Slant board KF                                                                                           Ther Activity             Patient education: pathoanatomy, nature of sxs, POC, HEP NS 8'                         Gait Training                                       Modalities                                            "

## 2024-06-05 ENCOUNTER — APPOINTMENT (OUTPATIENT)
Dept: PHYSICAL THERAPY | Facility: CLINIC | Age: 34
End: 2024-06-05
Payer: COMMERCIAL

## 2024-06-06 ENCOUNTER — OFFICE VISIT (OUTPATIENT)
Dept: PHYSICAL THERAPY | Facility: CLINIC | Age: 34
End: 2024-06-06
Payer: COMMERCIAL

## 2024-06-06 DIAGNOSIS — M25.572 ACUTE LEFT ANKLE PAIN: ICD-10-CM

## 2024-06-06 DIAGNOSIS — M76.62 ACHILLES TENDINITIS, LEFT LEG: Primary | ICD-10-CM

## 2024-06-06 PROCEDURE — 97140 MANUAL THERAPY 1/> REGIONS: CPT

## 2024-06-06 NOTE — PROGRESS NOTES
"Daily Note     Today's date: 2024  Patient name: Dieter Camargo  : 1990  MRN: [id number]  Referring provider: Charly Rodríguez DPM  Dx:   Encounter Diagnosis     ICD-10-CM    1. Achilles tendinitis, left leg  M76.62       2. Acute left ankle pain  M25.572                      Subjective: Pt reports today is a good day and he's feeling pretty good so far since starting stretches issued at [facility].       Objective: See treatment diary below      Assessment: Initiated PT POC today. Pt responded well to manual therapy and stretches- will monitor response upon NV. Tolerated treatment well. Patient demonstrated fatigue post treatment, exhibited good technique with therapeutic exercises, and would benefit from continued PT      Plan: Continue per plan of care.  Progress treatment as tolerated.       EPOC: 24      Manuals            IASM L Achilles  WE           +Laser  WE           +STM gastroc  WE                        Neuro Re-Ed             HR iso & ecc 3x15 8\", 3\" 3\"x15           SLS                                                                              Ther Ex             Slant board KE  30\"x3 pre & post manual           Slant board KF  30\"x3 pre & post manual           Bike  5 min                                                                            Ther Activity             Patient education: pathoanatomy, nature of sxs, POC, HEP NS 8'                         Gait Training                                       Modalities                                            "

## 2024-06-12 ENCOUNTER — OFFICE VISIT (OUTPATIENT)
Dept: PHYSICAL THERAPY | Facility: CLINIC | Age: 34
End: 2024-06-12
Payer: COMMERCIAL

## 2024-06-12 DIAGNOSIS — M25.572 ACUTE LEFT ANKLE PAIN: ICD-10-CM

## 2024-06-12 DIAGNOSIS — M76.62 ACHILLES TENDINITIS, LEFT LEG: Primary | ICD-10-CM

## 2024-06-12 PROCEDURE — 97140 MANUAL THERAPY 1/> REGIONS: CPT

## 2024-06-12 PROCEDURE — 97110 THERAPEUTIC EXERCISES: CPT

## 2024-06-12 NOTE — PROGRESS NOTES
"Daily Note     Today's date: 2024  Patient name: Dieter Camargo  : 1990  MRN: [id number]  Referring provider: Charly Rodríguez DPM  Dx:   Encounter Diagnosis     ICD-10-CM    1. Achilles tendinitis, left leg  M76.62       2. Acute left ankle pain  M25.572                      Subjective: Pt reports he is getting better but has a little achiness today.      Objective: See treatment diary below      Assessment: Tolerated treatment well. Patient exhibited good technique with therapeutic exercises and would benefit from continued PT for cont'd pain management.  Pt w/ less tightness in the achilles and gastroc today.      Plan: Continue per plan of care.  Progress treatment as tolerated.       EPOC: 24      Manuals           IASM L Achilles  WE JK          +Laser  WE JK          +STM gastroc  WE JK                       Neuro Re-Ed             HR iso & ecc 3x15 8\", 3\" 3\"x15 3\"x15 SL          SLS   nv                                                                           Ther Ex             Slant board KE  30\"x3 pre & post manual 30\"x3 pre & post manual          Slant board KF  30\"x3 pre & post manual 30\"x3 pre & post manual          Bike  5 min 6 min                                                                           Ther Activity             Patient education: pathoanatomy, nature of sxs, POC, HEP NS 8'                         Gait Training                                       Modalities                                              "

## 2024-06-17 ENCOUNTER — OFFICE VISIT (OUTPATIENT)
Dept: FAMILY MEDICINE CLINIC | Facility: CLINIC | Age: 34
End: 2024-06-17
Payer: COMMERCIAL

## 2024-06-17 VITALS — HEART RATE: 82 BPM | BODY MASS INDEX: 38.4 KG/M2 | OXYGEN SATURATION: 98 % | WEIGHT: 260 LBS | TEMPERATURE: 98.2 F

## 2024-06-17 DIAGNOSIS — I10 PRIMARY HYPERTENSION: ICD-10-CM

## 2024-06-17 DIAGNOSIS — Z00.00 ANNUAL PHYSICAL EXAM: Primary | ICD-10-CM

## 2024-06-17 DIAGNOSIS — E74.39 GLUCOSE INTOLERANCE: ICD-10-CM

## 2024-06-17 DIAGNOSIS — E66.01 CLASS 2 SEVERE OBESITY DUE TO EXCESS CALORIES WITH SERIOUS COMORBIDITY AND BODY MASS INDEX (BMI) OF 38.0 TO 38.9 IN ADULT (HCC): ICD-10-CM

## 2024-06-17 DIAGNOSIS — E78.2 MIXED HYPERLIPIDEMIA: ICD-10-CM

## 2024-06-17 PROBLEM — E66.812 CLASS 2 SEVERE OBESITY DUE TO EXCESS CALORIES WITH SERIOUS COMORBIDITY AND BODY MASS INDEX (BMI) OF 38.0 TO 38.9 IN ADULT (HCC): Status: ACTIVE | Noted: 2023-01-13

## 2024-06-17 PROCEDURE — 99395 PREV VISIT EST AGE 18-39: CPT | Performed by: NURSE PRACTITIONER

## 2024-06-17 PROCEDURE — 99214 OFFICE O/P EST MOD 30 MIN: CPT | Performed by: NURSE PRACTITIONER

## 2024-06-17 RX ORDER — LISINOPRIL 10 MG/1
10 TABLET ORAL DAILY
Qty: 90 TABLET | Refills: 0 | Status: SHIPPED | OUTPATIENT
Start: 2024-06-17

## 2024-06-17 RX ORDER — METFORMIN HYDROCHLORIDE 500 MG/1
500 TABLET, EXTENDED RELEASE ORAL 2 TIMES DAILY WITH MEALS
Qty: 180 TABLET | Refills: 3 | Status: SHIPPED | OUTPATIENT
Start: 2024-06-17

## 2024-06-17 NOTE — PROGRESS NOTES
Adult Annual Physical  Name: Dieter Camargo      : 1990      MRN: [id number]  Encounter Provider: ALEX Aguirre  Encounter Date: 2024   Encounter department: [facility]    Assessment & Plan   1. Annual physical exam  Labs and medications reviewed.    2. Mixed hyperlipidemia  -not controlled. Started on statin. Repeat lipids in 3 months. Intensify diet and exercise.    3. Primary hypertension  -     lisinopril (ZESTRIL) 10 mg tablet; Take 1 tablet (10 mg total) by mouth daily    -Bp is high today. Was elevated at last visit. Start lisinopril 10 mg daily. Monitor BP twice a day and return in 2 weeks for recheck.    4. Class 2 severe obesity due to excess calories with serious comorbidity and body mass index (BMI) of 38.0 to 38.9 in adult (HCC)  -intensify diet and exercise for weight loss.    5. Glucose intolerance  -     metFORMIN (GLUCOPHAGE-XR) 500 mg 24 hr tablet; Take 1 tablet (500 mg total) by mouth 2 (two) times a day with meals  -Ha1c is stable. Continue metformin as ordered.    Immunizations and preventive care screenings were discussed with patient today. Appropriate education was printed on patient's after visit summary.    Counseling:  Alcohol/drug use: discussed moderation in alcohol intake, the recommendations for healthy alcohol use, and avoidance of illicit drug use.  Dental Health: discussed importance of regular tooth brushing, flossing, and dental visits.  Injury prevention: discussed safety/seat belts, safety helmets, smoke detectors, carbon dioxide detectors, and smoking near bedding or upholstery.  Sexual health: discussed sexually transmitted diseases, partner selection, use of condoms, avoidance of unintended pregnancy, and contraceptive alternatives.  Exercise: the importance of regular exercise/physical activity was discussed. Recommend exercise 3-5 times per week for at least 30 minutes.       Depression Screening and Follow-up Plan:  Patient was screened for depression during today's encounter. They screened negative with a PHQ-2 score of 0.        History of Present Illness     Adult Annual Physical:  Patient presents for annual physical. Here for annual physical. BP high today 156/104. BP was high at last visit-patient dod not monitor his readings. Will start BP medication today. Recent labs showed  high cholesterol and triglycerides. Lifestyle modifications discussed. He was started on a statin. .     Diet and Physical Activity:  - Diet/Nutrition: poor diet.  - Exercise: no formal exercise.    Depression Screening:  - PHQ-2 Score: 0    General Health:  - Sleep: sleeps well.  - Hearing: normal hearing bilateral ears.  - Vision: goes for regular eye exams.  - Dental: regular dental visits.     Health:  - History of STDs: no.   - Urinary symptoms: none.     Advanced Care Planning:  - Has an advanced directive?: no    - Has a durable medical POA?: no    - ACP document given to patient?: yes      Review of Systems   Constitutional:  Negative for activity change, appetite change, chills, diaphoresis, fatigue and fever.   HENT:  Negative for congestion, dental problem, drooling, ear discharge, ear pain, facial swelling, hearing loss, mouth sores, nosebleeds, postnasal drip, rhinorrhea, sinus pressure, sinus pain, sneezing, sore throat, tinnitus, trouble swallowing and voice change.    Eyes:  Negative for photophobia, pain, discharge, redness, itching and visual disturbance.   Respiratory:  Negative for apnea, cough, choking, chest tightness, shortness of breath, wheezing and stridor.    Cardiovascular:  Negative for chest pain, palpitations and leg swelling.   Gastrointestinal:  Negative for abdominal distention, abdominal pain, anal bleeding, blood in stool, constipation, diarrhea, nausea, rectal pain and vomiting.   Endocrine: Negative for cold intolerance, heat intolerance, polydipsia, polyphagia and polyuria.   Genitourinary:  Negative for  decreased urine volume, difficulty urinating, dysuria, flank pain, frequency, hematuria, penile pain and urgency.   Musculoskeletal:  Negative for arthralgias, back pain, gait problem, joint swelling, myalgias, neck pain and neck stiffness.   Skin:  Negative for color change, pallor, rash and wound.   Neurological:  Negative for dizziness, tremors, seizures, syncope, facial asymmetry, speech difficulty, weakness, light-headedness, numbness and headaches.   Hematological:  Negative for adenopathy. Does not bruise/bleed easily.   Psychiatric/Behavioral:  Negative for agitation, behavioral problems, confusion, decreased concentration, dysphoric mood, hallucinations, self-injury, sleep disturbance and suicidal ideas. The patient is not nervous/anxious and is not hyperactive.          Objective     Pulse 82   Temp 98.2 °F (36.8 °C) (Tympanic)   Wt 118 kg (260 lb)   SpO2 98%   BMI 38.40 kg/m²     Physical Exam  Vitals and nursing note reviewed.   Constitutional:       General: He is not in acute distress.     Appearance: Normal appearance. He is well-developed. He is obese. He is not ill-appearing, toxic-appearing or diaphoretic.   HENT:      Head: Normocephalic and atraumatic.      Right Ear: Tympanic membrane, ear canal and external ear normal. There is no impacted cerumen.      Left Ear: Tympanic membrane, ear canal and external ear normal. There is no impacted cerumen.      Nose: Nose normal. No congestion or rhinorrhea.      Mouth/Throat:      Mouth: Mucous membranes are moist.      Pharynx: Oropharynx is clear. No oropharyngeal exudate or posterior oropharyngeal erythema.   Eyes:      General:         Right eye: No discharge.         Left eye: No discharge.      Extraocular Movements: Extraocular movements intact.      Conjunctiva/sclera: Conjunctivae normal.      Pupils: Pupils are equal, round, and reactive to light.   Neck:      Vascular: No carotid bruit.   Cardiovascular:      Rate and Rhythm: Normal rate  and regular rhythm.      Heart sounds: Normal heart sounds. No murmur heard.     No friction rub. No gallop.   Pulmonary:      Effort: Pulmonary effort is normal. No respiratory distress.      Breath sounds: Normal breath sounds. No stridor. No wheezing, rhonchi or rales.   Chest:      Chest wall: No tenderness.   Abdominal:      General: Bowel sounds are normal. There is no distension.      Palpations: Abdomen is soft. There is no mass.      Tenderness: There is no abdominal tenderness. There is no right CVA tenderness, left CVA tenderness, guarding or rebound.      Hernia: No hernia is present.   Musculoskeletal:         General: No swelling, tenderness, deformity or signs of injury. Normal range of motion.      Cervical back: Normal range of motion and neck supple. No rigidity or tenderness.      Right lower leg: No edema.      Left lower leg: No edema.   Lymphadenopathy:      Cervical: No cervical adenopathy.   Skin:     General: Skin is warm and dry.      Capillary Refill: Capillary refill takes less than 2 seconds.      Coloration: Skin is not jaundiced or pale.      Findings: No bruising, erythema, lesion or rash.   Neurological:      Mental Status: He is alert and oriented to person, place, and time.      Cranial Nerves: No cranial nerve deficit.      Sensory: No sensory deficit.      Motor: No weakness.      Coordination: Coordination normal.      Gait: Gait normal.      Deep Tendon Reflexes: Reflexes normal.   Psychiatric:         Mood and Affect: Mood normal.         Thought Content: Thought content normal.         Judgment: Judgment normal.

## 2024-06-19 ENCOUNTER — OFFICE VISIT (OUTPATIENT)
Dept: PHYSICAL THERAPY | Facility: CLINIC | Age: 34
End: 2024-06-19
Payer: COMMERCIAL

## 2024-06-19 DIAGNOSIS — M76.62 ACHILLES TENDINITIS, LEFT LEG: Primary | ICD-10-CM

## 2024-06-19 DIAGNOSIS — M25.572 ACUTE LEFT ANKLE PAIN: ICD-10-CM

## 2024-06-19 PROCEDURE — 97112 NEUROMUSCULAR REEDUCATION: CPT | Performed by: PHYSICAL THERAPIST

## 2024-06-19 PROCEDURE — 97110 THERAPEUTIC EXERCISES: CPT | Performed by: PHYSICAL THERAPIST

## 2024-06-19 NOTE — PROGRESS NOTES
"Daily Note     Today's date: 2024  Patient name: Dieter Camargo  : 1990  MRN: [id number]  Referring provider: Charly Rodríguez DPM  Dx:   Encounter Diagnosis     ICD-10-CM    1. Achilles tendinitis, left leg  M76.62       2. Acute left ankle pain  M25.572         Subjective: Compliant with HEP, no questions regarding POC, motivated to continue PT as symptoms are steadily improving     Objective: See treatment diary below    Assessment: Tolerated treatment well with initiation of full treatment program as noted below requiring verbal and tactile cues from PT for safe execution of therapeutic exercise, however pt's full, pain-free L ankle mobility & stability remain limited at this time, secondary to achilles tendinopathy, contributing to functional deficits. Patient demonstrated fatigue post treatment, exhibited good technique with therapeutic exercises, and would benefit from continued PT    Plan: Progress treatment as tolerated.       EPOC: 24    Manuals          IASM L Achilles  WE RICHARD FORDE         +Laser  WE RICHARD FORDE         +STM gastroc  WE JARY NS                      Neuro Re-Ed             HR iso & ecc 3x15 8\", 3\" 3\"x15 3\"x15 SL 3x15 SL airex         SLS   nv 5x10\" airex                                                                          Ther Ex             Slant board KE  30\"x3 pre & post manual 30\"x3 pre & post manual 30\"x3         Slant board KF  30\"x3 pre & post manual 30\"x3 pre & post manual 30x\"3         Bike  5 min 6 min 6' L1                                                                          Ther Activity             Patient education: pathoanatomy, nature of sxs, POC, HEP NS 8'                         Gait Training                                       Modalities                                                "

## 2024-06-26 ENCOUNTER — OFFICE VISIT (OUTPATIENT)
Dept: PODIATRY | Facility: CLINIC | Age: 34
End: 2024-06-26
Payer: COMMERCIAL

## 2024-06-26 ENCOUNTER — APPOINTMENT (OUTPATIENT)
Dept: PHYSICAL THERAPY | Facility: CLINIC | Age: 34
End: 2024-06-26
Payer: COMMERCIAL

## 2024-06-26 VITALS
HEART RATE: 64 BPM | HEIGHT: 69 IN | BODY MASS INDEX: 38.51 KG/M2 | DIASTOLIC BLOOD PRESSURE: 94 MMHG | SYSTOLIC BLOOD PRESSURE: 179 MMHG | WEIGHT: 260 LBS

## 2024-06-26 DIAGNOSIS — M76.62 ACHILLES TENDINITIS, LEFT LEG: Primary | ICD-10-CM

## 2024-06-26 PROCEDURE — 99213 OFFICE O/P EST LOW 20 MIN: CPT | Performed by: PODIATRIST

## 2024-06-26 NOTE — PROGRESS NOTES
PATIENT:  Dieter Camargo  1990       ASSESSMENT:     1. Achilles tendinitis, left leg  MRI ankle/heel left  wo contrast                  PLAN:  1. Reviewed medical records.  Reviewed the notes from PT.  Patient was counseled and educated on the condition and the diagnosis.    2. X-ray was personally reviewed.  The radiological findings were discussed with the patient.    3. The diagnosis, treatment options and prognosis were discussed with the patient.    4. He is not responding to conservative care well.  Will order MRI to rule out any achilles tendon tear.    5. Continue CAM boot.  Hold PT.    6. RA in 3 weeks.        Imaging: I have personally reviewed pertinent films in PACS  Labs, pathology, and Other Studies: I have personally reviewed pertinent reports.        Subjective:     HPI  The patient presents for follow-up on left ankle pain.  He was doing little better, but increased pain after PT session last Thursday.  He was not able to put pressure for a few days.  Today has been little better.  Pain is still about 7 out of 10 when he put pressure with the boot.   No increased swelling.      The following portions of the patient's history were reviewed and updated as appropriate: allergies, current medications, past family history, past medical history, past social history, past surgical history and problem list.  All pertinent labs and images were reviewed.      Past Medical History  Past Medical History:   Diagnosis Date    Allergic     Seasonal    Asthma 1999    Sleep apnea        Past Surgical History  Past Surgical History:   Procedure Laterality Date    MANDIBLE SURGERY      [place]    [place] VASECTOMY UNI/BI SPX W/POSTOP SEMEN EXAMS Right 1/13/2023    Procedure: RIGHT  SCROTUM EXPLORATION;  Surgeon: Sancho Ritchie MD;  Location: [facility] MAIN OR;  Service: Urology    WISDOM TOOTH EXTRACTION          Allergies:  Patient has no known allergies.    Medications:  Current Outpatient  Medications   Medication Sig Dispense Refill    albuterol (Proventil HFA) 90 mcg/act inhaler Inhale 2 puffs every 6 (six) hours as needed for wheezing 6.7 g 5    Diclofenac Sodium (VOLTAREN) 1 % Apply 2 g topically 4 (four) times a day 100 g 1    lisinopril (ZESTRIL) 10 mg tablet Take 1 tablet (10 mg total) by mouth daily 90 tablet 0    metFORMIN (GLUCOPHAGE-XR) 500 mg 24 hr tablet Take 1 tablet (500 mg total) by mouth 2 (two) times a day with meals 180 tablet 3    rosuvastatin (CRESTOR) 10 MG tablet Take 1 tablet (10 mg total) by mouth daily 90 tablet 1     No current facility-administered medications for this visit.       Social History:  Social History     Socioeconomic History    Marital status: /Civil Union     Spouse name: None    Number of children: None    Years of education: None    Highest education level: None   Occupational History    None   Tobacco Use    Smoking status: Never    Smokeless tobacco: Never   Vaping Use    Vaping status: Never Used   Substance and Sexual Activity    Alcohol use: Yes     Comment: Rarely    Drug use: Never    Sexual activity: Yes     Partners: Female   Other Topics Concern    None   Social History Narrative    None     Social Determinants of Health     Financial Resource Strain: Not on file   Food Insecurity: Not on file   Transportation Needs: Not on file   Physical Activity: Not on file   Stress: Not on file   Social Connections: Not on file   Intimate Partner Violence: Not on file   Housing Stability: Not on file          Review of Systems   Constitutional:  Negative for chills and fever.   Respiratory:  Negative for cough and shortness of breath.    Cardiovascular:  Negative for chest pain and leg swelling.   Gastrointestinal:  Negative for nausea and vomiting.   Skin:  Negative for wound.   Allergic/Immunologic: Negative for immunocompromised state.   Neurological:  Negative for weakness and numbness.   Hematological: Negative.    Psychiatric/Behavioral:   "Negative for behavioral problems and confusion.          Objective:      BP (!) 179/94 (BP Location: Left arm, Patient Position: Sitting, Cuff Size: Adult)   Pulse 64   Ht 5' 9\" (1.753 m) Comment: stated  Wt 118 kg (260 lb) Comment: stated in cam boot  BMI 38.40 kg/m²          Physical Exam  Vitals reviewed.   Constitutional:       General: He is not in acute distress.     Appearance: He is not toxic-appearing or diaphoretic.   Cardiovascular:      Rate and Rhythm: Normal rate and regular rhythm.      Pulses: Normal pulses.           Dorsalis pedis pulses are 2+ on the right side and 2+ on the left side.        Posterior tibial pulses are 2+ on the right side and 2+ on the left side.   Pulmonary:      Effort: Pulmonary effort is normal. No respiratory distress.   Musculoskeletal:         General: Tenderness present. No signs of injury.      Right lower leg: No edema.      Left lower leg: No edema.      Right foot: No foot drop.      Left foot: No foot drop.      Comments: Focal tenderness left posterior heel and distal achilles tendon.  Tendon function is WNL.  No achilles tendon defect.     Skin:     General: Skin is warm.      Capillary Refill: Capillary refill takes less than 2 seconds.      Coloration: Skin is not cyanotic or mottled.      Findings: No abscess or wound.      Nails: There is no clubbing.   Neurological:      General: No focal deficit present.      Mental Status: He is alert and oriented to person, place, and time.      Cranial Nerves: No cranial nerve deficit.      Sensory: No sensory deficit.      Motor: No weakness.      Coordination: Coordination normal.   Psychiatric:         Mood and Affect: Mood normal.         Behavior: Behavior normal.         Thought Content: Thought content normal.         Judgment: Judgment normal.         "

## 2024-07-06 ENCOUNTER — HOSPITAL ENCOUNTER (OUTPATIENT)
Dept: RADIOLOGY | Facility: HOSPITAL | Age: 34
Discharge: HOME | End: 2024-07-06
Attending: PODIATRIST
Payer: COMMERCIAL

## 2024-07-06 DIAGNOSIS — M76.62 ACHILLES TENDINITIS, LEFT LEG: ICD-10-CM

## 2024-07-06 PROCEDURE — 73721 MRI JNT OF LWR EXTRE W/O DYE: CPT

## 2024-07-15 ENCOUNTER — OFFICE VISIT (OUTPATIENT)
Dept: FAMILY MEDICINE CLINIC | Facility: CLINIC | Age: 34
End: 2024-07-15
Payer: COMMERCIAL

## 2024-07-15 VITALS — OXYGEN SATURATION: 98 % | WEIGHT: 252 LBS | BODY MASS INDEX: 37.21 KG/M2 | HEART RATE: 73 BPM

## 2024-07-15 DIAGNOSIS — I10 POOR HIGH BLOOD PRESSURE CONTROL: ICD-10-CM

## 2024-07-15 DIAGNOSIS — I10 PRIMARY HYPERTENSION: Primary | ICD-10-CM

## 2024-07-15 PROCEDURE — 99214 OFFICE O/P EST MOD 30 MIN: CPT | Performed by: NURSE PRACTITIONER

## 2024-07-15 RX ORDER — AMLODIPINE BESYLATE 5 MG/1
5 TABLET ORAL DAILY
Qty: 90 TABLET | Refills: 0 | Status: SHIPPED | OUTPATIENT
Start: 2024-07-15

## 2024-07-15 NOTE — ASSESSMENT & PLAN NOTE
BP is not controlled. 150/92 today. Home readings 150s-170s/90s. Denies chest pain, no shortness of breath. Started Lisinopril a few weeks ago. Will add Amlodipine 5 mg daily. Monitor BP twice a day and return in 2 weeks for recheck.

## 2024-07-15 NOTE — PATIENT INSTRUCTIONS
Continue Lisinopril 10 mg daily  Start Amlodipine 5 mg daily for BP control  Return in 2 weeks for recheck

## 2024-07-15 NOTE — PROGRESS NOTES
Ambulatory Visit  Name: Dieter Camargo      : 1990      MRN: [id number]  Encounter Provider: ALEX Aguirre  Encounter Date: 7/15/2024   Encounter department: [facility]    Assessment & Plan   1. Primary hypertension  Assessment & Plan:  BP is not controlled. 150/92 today. Home readings 150s-170s/90s. Denies chest pain, no shortness of breath. Started Lisinopril a few weeks ago. Will add Amlodipine 5 mg daily. Monitor BP twice a day and return in 2 weeks for recheck.  Orders:  -     amLODIPine (NORVASC) 5 mg tablet; Take 1 tablet (5 mg total) by mouth daily  2. Poor high blood pressure control  Assessment & Plan:  BP is not controlled. 150/92 today. Home readings 150s-170s/90s. Denies chest pain, no shortness of breath. Started Lisinopril a few weeks ago. Will add Amlodipine 5 mg daily. Monitor BP twice a day and return in 2 weeks for recheck.  Orders:  -     amLODIPine (NORVASC) 5 mg tablet; Take 1 tablet (5 mg total) by mouth daily       History of Present Illness     Here for BP check. Started Lisinopril 10 mg a few weeks ago. BP is elevated 150/92. Patient states readings at home range from 150s-170s/90s. Denies chest pain, no shortness of breath.        Review of Systems   Constitutional:  Negative for activity change and diaphoresis.   Eyes:  Negative for visual disturbance.   Respiratory:  Negative for chest tightness, shortness of breath and wheezing.    Cardiovascular:  Negative for chest pain, palpitations and leg swelling.   Gastrointestinal:  Negative for abdominal pain and vomiting.   Genitourinary:  Negative for difficulty urinating.   Skin:  Negative for color change and pallor.   Neurological:  Negative for dizziness, weakness and headaches.   Psychiatric/Behavioral:  Negative for dysphoric mood.        Objective     Pulse 73   Wt 114 kg (252 lb)   SpO2 98%   BMI 37.21 kg/m²     Physical Exam  Vitals and nursing note reviewed.   Constitutional:        General: He is not in acute distress.     Appearance: Normal appearance. He is obese. He is not ill-appearing or diaphoretic.   HENT:      Head: Normocephalic.   Eyes:      Extraocular Movements: Extraocular movements intact.   Cardiovascular:      Rate and Rhythm: Normal rate and regular rhythm.      Heart sounds: Normal heart sounds.   Pulmonary:      Effort: Pulmonary effort is normal. No respiratory distress.      Breath sounds: Normal breath sounds. No wheezing or rhonchi.   Chest:      Chest wall: No tenderness.   Musculoskeletal:         General: Normal range of motion.      Cervical back: Normal range of motion and neck supple.   Skin:     General: Skin is warm and dry.   Neurological:      Mental Status: He is alert and oriented to person, place, and time.   Psychiatric:         Mood and Affect: Mood normal.         Behavior: Behavior normal.

## 2024-07-24 ENCOUNTER — OFFICE VISIT (OUTPATIENT)
Dept: PODIATRY | Facility: CLINIC | Age: 34
End: 2024-07-24
Payer: COMMERCIAL

## 2024-07-24 VITALS
BODY MASS INDEX: 37.33 KG/M2 | SYSTOLIC BLOOD PRESSURE: 180 MMHG | DIASTOLIC BLOOD PRESSURE: 98 MMHG | HEIGHT: 69 IN | WEIGHT: 252 LBS

## 2024-07-24 DIAGNOSIS — M25.572 ACUTE LEFT ANKLE PAIN: ICD-10-CM

## 2024-07-24 DIAGNOSIS — M76.62 ACHILLES TENDINITIS, LEFT LEG: Primary | ICD-10-CM

## 2024-07-24 PROCEDURE — 99213 OFFICE O/P EST LOW 20 MIN: CPT | Performed by: PODIATRIST

## 2024-07-24 NOTE — PROGRESS NOTES
PATIENT:  Dieter Camargo  1990       ASSESSMENT:     1. Achilles tendinitis, left leg  Ambulatory Referral to Orthopedic Surgery      2. Acute left ankle pain                    PLAN:  1. Reviewed medical records.  Patient was counseled and educated on the condition and the diagnosis.    2. MRI was personally reviewed.  The radiological findings were discussed with the patient.  There is interstitial tear of achilles tendon at the insertion.    3. The diagnosis, treatment options and prognosis were discussed with the patient.    4. Discussed options and will try PRP injection.  Referred him to Sports Medicine.    5. Will need immobilization with CAM boot after the injection for 2 weeks, then recommend PT.  Possible surgery if he does not responds to conservative care.    6. RA in 8 weeks.        Imaging: I have personally reviewed pertinent films in PACS  Labs, pathology, and Other Studies: I have personally reviewed pertinent reports.        Subjective:     HPI  The patient presents for follow-up on left ankle pain.  He had MRI.  His pain has been little better after the new injury.  He wears CAM boot about 70% of the time.  No new complaint.        The following portions of the patient's history were reviewed and updated as appropriate: allergies, current medications, past family history, past medical history, past social history, past surgical history and problem list.  All pertinent labs and images were reviewed.      Past Medical History  Past Medical History:   Diagnosis Date    Allergic     Seasonal    Asthma 1999    Sleep apnea        Past Surgical History  Past Surgical History:   Procedure Laterality Date    MANDIBLE SURGERY      [place]    [place] VASECTOMY UNI/BI SPX W/POSTOP SEMEN EXAMS Right 1/13/2023    Procedure: RIGHT  SCROTUM EXPLORATION;  Surgeon: Sancho Ritchie MD;  Location: [facility] MAIN OR;  Service: Urology    WISDOM TOOTH EXTRACTION          Allergies:  Patient has no  known allergies.    Medications:  Current Outpatient Medications   Medication Sig Dispense Refill    albuterol (Proventil HFA) 90 mcg/act inhaler Inhale 2 puffs every 6 (six) hours as needed for wheezing 6.7 g 5    amLODIPine (NORVASC) 5 mg tablet Take 1 tablet (5 mg total) by mouth daily 90 tablet 0    Diclofenac Sodium (VOLTAREN) 1 % Apply 2 g topically 4 (four) times a day 100 g 1    lisinopril (ZESTRIL) 10 mg tablet Take 1 tablet (10 mg total) by mouth daily 90 tablet 0    metFORMIN (GLUCOPHAGE-XR) 500 mg 24 hr tablet Take 1 tablet (500 mg total) by mouth 2 (two) times a day with meals 180 tablet 3    rosuvastatin (CRESTOR) 10 MG tablet Take 1 tablet (10 mg total) by mouth daily 90 tablet 1     No current facility-administered medications for this visit.       Social History:  Social History     Socioeconomic History    Marital status: /Civil Union     Spouse name: None    Number of children: None    Years of education: None    Highest education level: None   Occupational History    None   Tobacco Use    Smoking status: Never    Smokeless tobacco: Never   Vaping Use    Vaping status: Never Used   Substance and Sexual Activity    Alcohol use: Yes     Comment: Rarely    Drug use: Never    Sexual activity: Yes     Partners: Female   Other Topics Concern    None   Social History Narrative    None     Social Determinants of Health     Financial Resource Strain: Not on file   Food Insecurity: Not on file   Transportation Needs: Not on file   Physical Activity: Not on file   Stress: Not on file   Social Connections: Not on file   Intimate Partner Violence: Not on file   Housing Stability: Not on file          Review of Systems   Constitutional:  Negative for chills and fever.   Respiratory:  Negative for cough and shortness of breath.    Cardiovascular:  Negative for chest pain and leg swelling.   Gastrointestinal:  Negative for nausea and vomiting.   Skin:  Negative for wound.   Allergic/Immunologic: Negative  "for immunocompromised state.   Neurological:  Negative for weakness and numbness.   Hematological: Negative.    Psychiatric/Behavioral:  Negative for behavioral problems and confusion.          Objective:      BP (!) 180/98 (BP Location: Left arm, Patient Position: Sitting, Cuff Size: Adult)   Ht 5' 9\" (1.753 m) Comment: stated  Wt 114 kg (252 lb)   BMI 37.21 kg/m²          Physical Exam  Vitals reviewed.   Constitutional:       General: He is not in acute distress.     Appearance: He is not toxic-appearing or diaphoretic.   Cardiovascular:      Rate and Rhythm: Normal rate and regular rhythm.      Pulses: Normal pulses.           Dorsalis pedis pulses are 2+ on the right side and 2+ on the left side.        Posterior tibial pulses are 2+ on the right side and 2+ on the left side.   Pulmonary:      Effort: Pulmonary effort is normal. No respiratory distress.   Musculoskeletal:         General: Tenderness present. No signs of injury.      Right lower leg: No edema.      Left lower leg: No edema.      Right foot: No foot drop.      Left foot: No foot drop.      Comments: Tenderness left posterior heel and distal achilles tendon.  Tendon function is WNL.  No achilles tendon defect.     Skin:     General: Skin is warm.      Capillary Refill: Capillary refill takes less than 2 seconds.      Coloration: Skin is not cyanotic or mottled.      Findings: No abscess or wound.      Nails: There is no clubbing.   Neurological:      General: No focal deficit present.      Mental Status: He is alert and oriented to person, place, and time.      Cranial Nerves: No cranial nerve deficit.      Sensory: No sensory deficit.      Motor: No weakness.      Coordination: Coordination normal.   Psychiatric:         Mood and Affect: Mood normal.         Behavior: Behavior normal.         Thought Content: Thought content normal.         Judgment: Judgment normal.         "

## 2024-08-05 ENCOUNTER — OFFICE VISIT (OUTPATIENT)
Dept: SLEEP CENTER | Facility: CLINIC | Age: 34
End: 2024-08-05
Payer: COMMERCIAL

## 2024-08-05 VITALS
HEART RATE: 75 BPM | SYSTOLIC BLOOD PRESSURE: 142 MMHG | OXYGEN SATURATION: 98 % | BODY MASS INDEX: 38.21 KG/M2 | HEIGHT: 69 IN | DIASTOLIC BLOOD PRESSURE: 98 MMHG | WEIGHT: 258 LBS

## 2024-08-05 DIAGNOSIS — F51.12 INSUFFICIENT SLEEP SYNDROME: ICD-10-CM

## 2024-08-05 DIAGNOSIS — J30.2 SEASONAL ALLERGIES: ICD-10-CM

## 2024-08-05 DIAGNOSIS — E66.9 OBESITY (BMI 30-39.9): ICD-10-CM

## 2024-08-05 DIAGNOSIS — G47.33 OSA (OBSTRUCTIVE SLEEP APNEA): Primary | ICD-10-CM

## 2024-08-05 DIAGNOSIS — G47.19 EXCESSIVE DAYTIME SLEEPINESS: ICD-10-CM

## 2024-08-05 DIAGNOSIS — R68.2 DRY MOUTH: ICD-10-CM

## 2024-08-05 DIAGNOSIS — R63.4 WEIGHT REDUCTION: ICD-10-CM

## 2024-08-05 PROCEDURE — 99214 OFFICE O/P EST MOD 30 MIN: CPT | Performed by: INTERNAL MEDICINE

## 2024-08-05 NOTE — PROGRESS NOTES
Follow-Up Note - [facility]   Dieter Camargo  34 y.o. male  :1990  MRN:[id number]  DOS:2024    CC: I saw this patient for follow-up in clinic today for Sleep disordered breathing, Coexisting Sleep and Medical Problems.  He is using a ResMed machine. Interval changes: None reported.    Results of prior studies in : HST demonstrated respiratory event index (AUGUSTINA) of 40.5.  The lowest SPO2 recorded is 73% and 8.2% of the study was spent with saturations below 90%.  The snore index was 71.5%. During the subsequent therapeutic study, sleep disordered breathing was successfully remediated with PAP at 12 H2O.    PFSH, Problem List, Medications & Allergies were reviewed in EMR.   He  has a past medical history of Allergic, Asthma (), and Sleep apnea.    He has a current medication list which includes the following prescription(s): albuterol, amlodipine, diclofenac sodium, lisinopril, metformin, and rosuvastatin.    PHYSIOLOGICAL DATA REVIEW : Using PAP > 4 hours/night 83%. Estimated AUGUSTINA 2.1/hour with pressure of 12cm H2O ; patient has not been using non FDA approved devices to sanitize the machine.  INTERPRETATION: Compliance is Very good; Pressure setting is:optimal; ;   SUBJECTIVE: With respect to use of PAP, Dieter  is experiencing minimal adverse effects:dry mouth/throat and dry nose.He derives benefit.  Is satisfied with sleep and daytime function.   Sleep Routine: Dieter reports getting 7 hrs sleep; he has no difficulty initiating or maintaining sleep . He arises spontaneously and feels more refreshed since on Rx.Dieter reports significantly improved excessive daytime sleepiness,.  He rated himself at Total score: 7 /24 on the Leupp Sleepiness Scale.   Other issues: None reported.     Habits: Reports that he has never smoked. He has never used smokeless tobacco.,  Reports current alcohol use.,  Reports no history of drug use., Caffeine use:limited; Exercise routine: irregular because of  "Achilles tendon tear..      ROS: Significant for around 30 pounds intentional.  Weight reduction since his last visit..  Nasal symptoms due to seasonal allergies for which he uses OTC meds.  Review of systems was otherwise negative.    EXAM: /98 (BP Location: Left arm, Patient Position: Sitting, Cuff Size: Adult)   Pulse 75   Ht 5' 9\" (1.753 m)   Wt 117 kg (258 lb)   SpO2 98%   BMI 38.10 kg/m²     Wt Readings from Last 3 Encounters:   08/05/24 117 kg (258 lb)   07/24/24 114 kg (252 lb)   07/15/24 114 kg (252 lb)      Patient is well groomed; well appearing.   CNS: Alert, orientated, speech clear & coherent  Psych: cooperative and in no distress. Mental state: Appears normal.  H&N: EOMI; NC/AT: No facial pressure marks, no rashes.    Skin/Extrem: col & hydration normal; no edema  Resp: Respiratory effort is normal  Physical findings otherwise essentially unchanged from previous.    IMPRESSION: Problem List Items & Comorbidities Addressed this Visit    1. DAVID (obstructive sleep apnea)  PAP DME Resupply/Reorder      2. Excessive daytime sleepiness        3. Insufficient sleep syndrome        4. Dry mouth        5. Seasonal allergies        6. Weight reduction        7. Obesity (BMI 30-39.9)        1-3 improved    PLAN:  I reviewed results of prior studies and physiologic data with the patient.   I discussed treatment options with risks and benefits.  Treatment with  PAP is medically necessary and Dieter is agreable to continue use.   Care of equipment, methods to improve comfort using PAP and importance of compliance with therapy were discussed.  Pressure setting: continue 12 cmH2O. I reviewed the titration table and suggested changing to auto titrating 10-12 cm H2O in view of weight reduction.  He prefers to maintain pressure at current setting.  Rx provided to replace supplies and Care coordinated with DME provider.   Encouraged to persist strategies for weight reduction.    Follow-up is advised in 1 year " "or sooner if needed to monitor progress, compliance and to adjust therapy.     Thank you for allowing me to participate in the care of this patient.    Sincerely,     Authenticated electronically on 08/05/24   Board Certified Specialist     Portions of the record may have been created with voice recognition software. Occasional wrong word or \"sound a like\" substitutions may have occurred due to the inherent limitations of voice recognition software. There may also be notations and random deletions of words or characters from malfunctioning software. Read the chart carefully and recognize, using context, where substitutions/deletions have occurred.    "

## 2024-08-06 ENCOUNTER — TELEPHONE (OUTPATIENT)
Dept: FAMILY MEDICINE CLINIC | Facility: CLINIC | Age: 34
End: 2024-08-06

## 2024-08-07 ENCOUNTER — OFFICE VISIT (OUTPATIENT)
Dept: FAMILY MEDICINE CLINIC | Facility: CLINIC | Age: 34
End: 2024-08-07
Payer: COMMERCIAL

## 2024-08-07 VITALS
DIASTOLIC BLOOD PRESSURE: 86 MMHG | SYSTOLIC BLOOD PRESSURE: 152 MMHG | HEART RATE: 71 BPM | WEIGHT: 258 LBS | OXYGEN SATURATION: 100 % | BODY MASS INDEX: 38.1 KG/M2

## 2024-08-07 DIAGNOSIS — I10 PRIMARY HYPERTENSION: Primary | ICD-10-CM

## 2024-08-07 PROCEDURE — 99214 OFFICE O/P EST MOD 30 MIN: CPT | Performed by: NURSE PRACTITIONER

## 2024-08-07 NOTE — PATIENT INSTRUCTIONS
Increase amlodipine to 10 mg daily. Continue Lisinopril 10 mg daily. Instructed lifestyle modifications:healthy diet/exercise for weight loss. Have fasting lipids as previously ordered.  Return in 2 weeks for recheck.

## 2024-08-07 NOTE — ASSESSMENT & PLAN NOTE
BP readings elevated. Some improvement since adding amlodipine. Ranges at home 140s-150s/80s. Increase amlodipine to 10 mg daily. Continue Lisinopril 10 mg daily. Instructed lifestyle modifications:healthy diet/exercise for weight loss. Return in 2 weeks for recheck.

## 2024-08-07 NOTE — PROGRESS NOTES
Ambulatory Visit  Name: Dieter Camargo      : 1990      MRN: [id number]  Encounter Provider: ALEX Aguirre  Encounter Date: 2024   Encounter department: [facility]    Assessment & Plan   1. Primary hypertension  Assessment & Plan:  BP readings elevated. Some improvement since adding amlodipine. Ranges at home 140s-150s/80s. Increase amlodipine to 10 mg daily. Continue Lisinopril 10 mg daily. Instructed lifestyle modifications:healthy diet/exercise for weight loss. Return in 2 weeks for recheck.       History of Present Illness     Here for BP management. BP readings elevated. Some improvement since adding amlodipine. Ranges at home 140s-150s/8. Reading today when roomed 150/80 left arm, 152/86 in right arm. Denies chest pain, no shortness of breath.        Review of Systems   Constitutional:  Negative for activity change and diaphoresis.   Eyes:  Negative for visual disturbance.   Respiratory:  Negative for chest tightness, shortness of breath and wheezing.    Cardiovascular:  Negative for chest pain, palpitations and leg swelling.   Gastrointestinal:  Negative for abdominal pain and nausea.   Genitourinary:  Negative for difficulty urinating.   Skin:  Negative for color change and pallor.   Neurological:  Negative for dizziness and weakness.   Psychiatric/Behavioral:  Negative for dysphoric mood.        Objective     /86   Pulse 71   Wt 117 kg (258 lb)   SpO2 100%   BMI 38.10 kg/m²     Physical Exam  Vitals and nursing note reviewed.   Constitutional:       General: He is not in acute distress.     Appearance: Normal appearance. He is not ill-appearing.   HENT:      Head: Normocephalic.   Eyes:      Extraocular Movements: Extraocular movements intact.   Cardiovascular:      Rate and Rhythm: Normal rate and regular rhythm.      Heart sounds: Normal heart sounds.   Pulmonary:      Effort: Pulmonary effort is normal. No respiratory distress.       Breath sounds: Normal breath sounds. No wheezing.   Musculoskeletal:         General: Normal range of motion.   Skin:     General: Skin is warm and dry.      Coloration: Skin is not pale.      Findings: No erythema.   Neurological:      Mental Status: He is alert and oriented to person, place, and time.   Psychiatric:         Mood and Affect: Mood normal.         Behavior: Behavior normal.

## 2024-08-08 ENCOUNTER — OFFICE VISIT (OUTPATIENT)
Dept: OBGYN CLINIC | Facility: OTHER | Age: 34
End: 2024-08-08
Payer: COMMERCIAL

## 2024-08-08 ENCOUNTER — TELEPHONE (OUTPATIENT)
Dept: SLEEP CENTER | Facility: CLINIC | Age: 34
End: 2024-08-08

## 2024-08-08 ENCOUNTER — TELEPHONE (OUTPATIENT)
Dept: OBGYN CLINIC | Facility: OTHER | Age: 34
End: 2024-08-08

## 2024-08-08 VITALS
WEIGHT: 258 LBS | HEART RATE: 81 BPM | DIASTOLIC BLOOD PRESSURE: 97 MMHG | HEIGHT: 69 IN | SYSTOLIC BLOOD PRESSURE: 146 MMHG | BODY MASS INDEX: 38.21 KG/M2

## 2024-08-08 DIAGNOSIS — M76.62 ACHILLES TENDINITIS, LEFT LEG: ICD-10-CM

## 2024-08-08 DIAGNOSIS — S86.012A PARTIAL TEAR OF LEFT ACHILLES TENDON, INITIAL ENCOUNTER: Primary | ICD-10-CM

## 2024-08-08 PROCEDURE — 99244 OFF/OP CNSLTJ NEW/EST MOD 40: CPT | Performed by: FAMILY MEDICINE

## 2024-08-08 NOTE — LETTER
August 8, 2024     Charly Rodríguez DPM  [address] [facility] [id number]    Patient: Dieter Camargo   YOB: 1990   Date of Visit: 8/8/2024       Dear Dr. Rodríguez:    Thank you for referring Dieter Camargo to me for evaluation. Below are my notes for this consultation.    If you have questions, please do not hesitate to call me. I look forward to following your patient along with you.         Sincerely,        Petar Mark III, DO        CC: No Recipients    Petar Mark III, DO  8/8/2024  6:14 PM  Sign when Signing Visit  1. Partial tear of left Achilles tendon, initial encounter        2. Achilles tendinitis, left leg  Ambulatory Referral to Orthopedic Surgery        No orders of the defined types were placed in this encounter.       IMAGING STUDIES: (I personally reviewed images in PACS and report):  MRI left ankle 7/6/24:  Partial tear achilles insertion  Report:  Achilles insertional tendinosis with reactive marrow edema at the calcaneus. There is a partial tear at the insertion that is estimated at 50% thickness and extent and measuring up to 8 mm in transverse dimension.              ASSESSMENT/PLAN:  Chronic Left Achilles Tendinitis  Partial Achilles Insertional Tear    Repeat X-ray next visit: None    Return for Follow-up for Ultrasound Guided Injection PRP .    Patient instructions below verbally summarized in person during encounter:  Patient Instructions   I explained to the patient that in addition to Achilles tendon chronic tendinitis, he does have partial tearing at the insertional point of the Achilles tendon on his MRI.  As such, I explained that pursuing PRP injections for this would be with a modified protocol to allow for healing time of this partial tear.  For example I recommend PRP injection followed by conservative treatment for partial Achilles tendon tear including splint and plantarflexion followed by controlled ankle motion boot heel lift and gradual  reduction of heel lift.      After healing timeframe for this partial tear I then recommend a repeat PRP injection prior to beginning rehabilitation at or around the 8 week  bella to continue jump starting the healing process for this tear.  I explained the patient that PRP is unproven and would be performed to help avoid surgery but not guaranteed.  I explained there is a chance he could still potentially require surgical intervention in the future.    Patient expressed understanding and agreed to plan.     PRP stands for Platelet Rich Plasma and is a Injectate solution prepared from a patient's own blood that has a high concentration of platelets. Blood is drawn from the patient in the same way that occurs during a routine blood draw. A special machine then extracts platelet growth factors and other natural chemicals from the patient's own blood that are hypothesized to help healing tissue. PRP has been used to help healing tissues since approximately 1999, and specifically, for arthritis and cartilage problems since 2003.     Injection of PRP is recommended under ultrasound to attain visuzlization of needle and injectate into target site. Once injected, PRP begins to clot and within 10 minutes of clotting, the platelets begin to secrete their PDGFs (Platelet derived growth factors) which help to aid healing. Lab studies in petri dishes have shown that these healing factors aid in growth of tissue. There have been a number of studies to show evidence that PRP is better in humans when injected compared to placebo, but there is no definitive evidence. Specifically, PRP injection does not result in relief consistently 100% of the time and is considered experimental. As such, PRP should not be viewed as curative but as a treatment modality that may offer relief.             James R Lachman, M.D.  Attending, Orthopaedic Surgery  [facility]    Achilles Rupture - Nonoperative Treatment  Protocol    OVERVIEW:  Week 0: cast, touchdown weightbearing with crutches / walker  Avoid prolonged dependent position (keep foot elevated)  Week 2: CAM walker boot with 3 heel wedges, weightbearing as tolerated  Wear boot AT ALL TIMES  Week 6: CAM walker boot, remove one wedge each week (so down to 2 wedges at week 6, 1 wedge at week 7, 0 wedges at week 8)  Start gentle active ankle range of motion (trace alphabet with toes, circles in both directions)  Week 8: start PT, transition to shoe with heel lift, wean off crutches  PT 2-3 times/week and home exercises daily  Progress with PT over ~4 months  Week 12: wean off of shoe lift  Week 20 / 5 months: gentle jog  Week 24 / 6 months:  Gradual return to sports as tolerated    DETAILED PHYSICAL THERAPY PROTOCOL:  Phase I: weeks 8 - 12  Goals:  Normalize gait  Progress range of motion  Normalize dorsiflexion, inversion, and eversion ankle strength 5/5  Treatment Recommendations:  Gait training, wean off crutches/cane when gait non-antalgic  Heel lift in shoe to assist non-apprehensive and normalized gait  AROM dorsiflexion/plantarflexion/inversion/eversion  Proprioception training  Isometrics/isotonics: inversion/eversion  Sitting heel rise  PREs plantarflexion/dorsiflexion with knee flexed to 90; after 2 weeks, progress to PREs with knee extended to 0  Leg press  Bike  Alphabet drawing  Retro treadmill  Forward step up program  Underwater treadmill system for gait training  Precautions:  Avoid passive heel cord stretching  Minimum Criteria for Advancement:  Normal gait pattern  Manual muscle grade test of 5/5 for dorsiflexion, inversion, and eversion    Phase II: weeks 12 - 20  Goals:  Restore full functional range of motion  Normalize plantarflexion strength 5/5  Normalize balance  Return to functional activities without pain  Ability to descend stairs  Treatment Recommendations:  Submaximal sport-specific skill development  Proprioception training: BAPS, prop board,  foam rollers, trampoline, [employer]  Isometrics/isotonics: inversion/eversion  Isokinetic plantarflexion/dorsiflexion  Standing heel rise  Aggressive PREs plantarflexion  Progress proximal strengthening (PREs)  Kofi Murrell Versaclimber  Forward step down program  Running in underwater treadmill system  Precautions:  Avoid pain with therapeutic exercise and functional activities  Avoid high loading the Achilles tendon (i.e. aggressive stretching in dorsiflexion with body weight or jumping)  Minimum Criteria for Advancement:  No apprehension with activities of daily living  Normal flexibility  Adequate strength base shown by ability to perform ten unilateral heel raises  Ability to descend stairs reciprocally  Symmetrical lower extremity balance    Phase III: weeks 20 - 28  Goals:  Demonstrate ability to run forward on a treadmill symptom-free  Average peak torque of 75% with isokinetic testing  Maximize strength and flexibility as to meet all demands of ADLs  Return to functional activity without limitation  Higher level of dynamic activity with lack of apprehension with sport-specific movements  Treatment Recommendations:  Start forward treadmill running  Isokinetic testing and training  Continue lower extremity strengthening and flexibility program  Advance proprioception training with perturbation  Light plyometric training (bilateral jumping activities)  Continue aggressive plantarflexion PREs (emphasize eccentric activity)  Submaximal sport specific skill development drills  Progress Kofi murrell Versaclimber  Continue to progress proximal strengthening of lower extremities (PREs)  Precautions:  No apprehension or pain with dynamic activity  Avoid running or sport activity until adequate strength and flexibility is achieved  Minimum Criteria for Advancement:  Pain free running  Average peak torque of isokinetic test = 75% of non-involved  Normal strength (5/5 throughout ankle)  Sports-specific drills  with zero apprehension    Phase IV: Return to Sport (weeks 28 - one year)  Goals:  Lack of apprehension with sports activity  Maximize strength and flexibility as to meet demands of individual's sport activity  Treatment Recommendations:  Advanced functional exercises and agility exercises  Plyometrics  Sport-specific exercises  Isokinetic testing  Functional test assessment (such as vertical jump test)  Precautions:  Avoid pain with therapeutic, functional, and sport activity  Avoid full sport activity until adequate strength and flexibility  Criteria for Discharge:  Flexibility and strength to accepted levels for sports performance  Lack of apprehension with sport-specific movements  85% limb symmetry with vertical jump test  85% limb symmetry for average peak isokinetic torque (PF/DF/inv/ev)  Independent performance of gym/home exercise program            __________________________________________________________________________    HISTORY OF PRESENT ILLNESS:  Left achilles tendinitis. Chronic intermittent for years. Recurrent flare since May 2024. New injury June 2024. Mri revealed partial tear insertional point.  Patient seen by Dr. Rodríguez 7/24/2024 recommended for a consultation to consider PRP as adjunct of therapy for nonoperative treatment of partial Achilles tendon rupture.    Today, patient continues to have posterior heel pain worse with activity moderate intensity.  He desires to avoid surgical intervention but is open to PRP procedure.  He has been utilizing control ankle motion boot but is not wearing today due to significant rain in the last few days and fear of fall.    Review of Systems      Following history reviewed and update:    Past Medical History:   Diagnosis Date   • Allergic     Seasonal   • Asthma 1999   • Sleep apnea      Past Surgical History:   Procedure Laterality Date   • MANDIBLE SURGERY      [place]   • ID VASECTOMY UNI/BI SPX W/POSTOP SEMEN EXAMS Right 1/13/2023    Procedure: RIGHT   "SCROTUM EXPLORATION;  Surgeon: Sancho Ritchie MD;  Location: [facility];  Service: Urology   • WISDOM TOOTH EXTRACTION       Social History  Social History     Substance and Sexual Activity   Alcohol Use Yes    Comment: Rarely     Social History     Substance and Sexual Activity   Drug Use Never     Social History     Tobacco Use   Smoking Status Never   Smokeless Tobacco Never     Family History   Problem Relation Age of Onset   • Diabetes type II Mother    • Hypertension Mother    • Diabetes Mother         Type 2   • Heart disease Father    • Diabetes type I Maternal Grandmother    • Diabetes Maternal Grandmother         Type 1     No Known Allergies       Physical Exam  /97 (BP Location: Right arm, Patient Position: Sitting, Cuff Size: Standard)   Pulse 81   Ht 5' 9\" (1.753 m)   Wt 117 kg (258 lb)   BMI 38.10 kg/m²         Ortho Exam  LEFT ACHILLES EXAMINATION:  No swelling erythema or increased warmth  + Tenderness insertional point Achilles tendon reduced chief complaint of pain  Simmonds’ Triad:  Palpable Gap or Defect of Achilles: none  Angle of Declination: angle of baseline plantarflexion symmetric to contralateral side  Matles Test (patient prone, intact and symmetric plantarflexion of ankle when flexing knee): intact  Medina's Calf Squeeze Test: intact obligatory plantarflexion    __________________________________________________________________________  Procedures                  "

## 2024-08-08 NOTE — PATIENT INSTRUCTIONS
I explained to the patient that in addition to Achilles tendon chronic tendinitis, he does have partial tearing at the insertional point of the Achilles tendon on his MRI.  As such, I explained that pursuing PRP injections for this would be with a modified protocol to allow for healing time of this partial tear.  For example I recommend PRP injection followed by conservative treatment for partial Achilles tendon tear including splint and plantarflexion followed by controlled ankle motion boot heel lift and gradual reduction of heel lift.      After healing timeframe for this partial tear I then recommend a repeat PRP injection prior to beginning rehabilitation at or around the 8 week  bella to continue jump starting the healing process for this tear.  I explained the patient that PRP is unproven and would be performed to help avoid surgery but not guaranteed.  I explained there is a chance he could still potentially require surgical intervention in the future.    Patient expressed understanding and agreed to plan.     PRP stands for Platelet Rich Plasma and is a Injectate solution prepared from a patient's own blood that has a high concentration of platelets. Blood is drawn from the patient in the same way that occurs during a routine blood draw. A special machine then extracts platelet growth factors and other natural chemicals from the patient's own blood that are hypothesized to help healing tissue. PRP has been used to help healing tissues since approximately 1999, and specifically, for arthritis and cartilage problems since 2003.     Injection of PRP is recommended under ultrasound to attain visuzlization of needle and injectate into target site. Once injected, PRP begins to clot and within 10 minutes of clotting, the platelets begin to secrete their PDGFs (Platelet derived growth factors) which help to aid healing. Lab studies in petri dishes have shown that these healing factors aid in growth of tissue. There  have been a number of studies to show evidence that PRP is better in humans when injected compared to placebo, but there is no definitive evidence. Specifically, PRP injection does not result in relief consistently 100% of the time and is considered experimental. As such, PRP should not be viewed as curative but as a treatment modality that may offer relief.             James R Lachman, M.D.  Attending, Orthopaedic Surgery  [facility]    Achilles Rupture - Nonoperative Treatment Protocol    OVERVIEW:  Week 0: cast, touchdown weightbearing with crutches / walker  Avoid prolonged dependent position (keep foot elevated)  Week 2: CAM walker boot with 3 heel wedges, weightbearing as tolerated  Wear boot AT ALL TIMES  Week 6: CAM walker boot, remove one wedge each week (so down to 2 wedges at week 6, 1 wedge at week 7, 0 wedges at week 8)  Start gentle active ankle range of motion (trace alphabet with toes, circles in both directions)  Week 8: start PT, transition to shoe with heel lift, wean off crutches  PT 2-3 times/week and home exercises daily  Progress with PT over ~4 months  Week 12: wean off of shoe lift  Week 20 / 5 months: gentle jog  Week 24 / 6 months:  Gradual return to sports as tolerated    DETAILED PHYSICAL THERAPY PROTOCOL:  Phase I: weeks 8 - 12  Goals:  Normalize gait  Progress range of motion  Normalize dorsiflexion, inversion, and eversion ankle strength 5/5  Treatment Recommendations:  Gait training, wean off crutches/cane when gait non-antalgic  Heel lift in shoe to assist non-apprehensive and normalized gait  AROM dorsiflexion/plantarflexion/inversion/eversion  Proprioception training  Isometrics/isotonics: inversion/eversion  Sitting heel rise  PREs plantarflexion/dorsiflexion with knee flexed to 90; after 2 weeks, progress to PREs with knee extended to 0  Leg press  Bike  Alphabet drawing  Retro treadmill  Forward step up program  Underwater treadmill system for gait  training  Precautions:  Avoid passive heel cord stretching  Minimum Criteria for Advancement:  Normal gait pattern  Manual muscle grade test of 5/5 for dorsiflexion, inversion, and eversion    Phase II: weeks 12 - 20  Goals:  Restore full functional range of motion  Normalize plantarflexion strength 5/5  Normalize balance  Return to functional activities without pain  Ability to descend stairs  Treatment Recommendations:  Submaximal sport-specific skill development  Proprioception training: BAPS, prop board, foam rollers, trampoline, Neurocom  Isometrics/isotonics: inversion/eversion  Isokinetic plantarflexion/dorsiflexion  Standing heel rise  Aggressive PREs plantarflexion  Progress proximal strengthening (PREs)  Kofi Plummer Versaclimber  Forward step down program  Running in underwater treadmill system  Precautions:  Avoid pain with therapeutic exercise and functional activities  Avoid high loading the Achilles tendon (i.e. aggressive stretching in dorsiflexion with body weight or jumping)  Minimum Criteria for Advancement:  No apprehension with activities of daily living  Normal flexibility  Adequate strength base shown by ability to perform ten unilateral heel raises  Ability to descend stairs reciprocally  Symmetrical lower extremity balance    Phase III: weeks 20 - 28  Goals:  Demonstrate ability to run forward on a treadmill symptom-free  Average peak torque of 75% with isokinetic testing  Maximize strength and flexibility as to meet all demands of ADLs  Return to functional activity without limitation  Higher level of dynamic activity with lack of apprehension with sport-specific movements  Treatment Recommendations:  Start forward treadmill running  Isokinetic testing and training  Continue lower extremity strengthening and flexibility program  Advance proprioception training with perturbation  Light plyometric training (bilateral jumping activities)  Continue aggressive plantarflexion PREs  (emphasize eccentric activity)  Submaximal sport specific skill development drills  Progress bike, Stairmaster, Versaclimber  Continue to progress proximal strengthening of lower extremities (PREs)  Precautions:  No apprehension or pain with dynamic activity  Avoid running or sport activity until adequate strength and flexibility is achieved  Minimum Criteria for Advancement:  Pain free running  Average peak torque of isokinetic test = 75% of non-involved  Normal strength (5/5 throughout ankle)  Sports-specific drills with zero apprehension    Phase IV: Return to Sport (weeks 28 - one year)  Goals:  Lack of apprehension with sports activity  Maximize strength and flexibility as to meet demands of individual's sport activity  Treatment Recommendations:  Advanced functional exercises and agility exercises  Plyometrics  Sport-specific exercises  Isokinetic testing  Functional test assessment (such as vertical jump test)  Precautions:  Avoid pain with therapeutic, functional, and sport activity  Avoid full sport activity until adequate strength and flexibility  Criteria for Discharge:  Flexibility and strength to accepted levels for sports performance  Lack of apprehension with sport-specific movements  85% limb symmetry with vertical jump test  85% limb symmetry for average peak isokinetic torque (PF/DF/inv/ev)  Independent performance of gym/home exercise program

## 2024-08-08 NOTE — PROGRESS NOTES
1. Partial tear of left Achilles tendon, initial encounter        2. Achilles tendinitis, left leg  Ambulatory Referral to Orthopedic Surgery        No orders of the defined types were placed in this encounter.       IMAGING STUDIES: (I personally reviewed images in PACS and report):  MRI left ankle 7/6/24:  Partial tear achilles insertion  Report:  Achilles insertional tendinosis with reactive marrow edema at the calcaneus. There is a partial tear at the insertion that is estimated at 50% thickness and extent and measuring up to 8 mm in transverse dimension.              ASSESSMENT/PLAN:  Chronic Left Achilles Tendinitis  Partial Achilles Insertional Tear    Repeat X-ray next visit: None    Return for Follow-up for Ultrasound Guided Injection PRP .    Patient instructions below verbally summarized in person during encounter:  Patient Instructions   I explained to the patient that in addition to Achilles tendon chronic tendinitis, he does have partial tearing at the insertional point of the Achilles tendon on his MRI.  As such, I explained that pursuing PRP injections for this would be with a modified protocol to allow for healing time of this partial tear.  For example I recommend PRP injection followed by conservative treatment for partial Achilles tendon tear including splint and plantarflexion followed by controlled ankle motion boot heel lift and gradual reduction of heel lift.      After healing timeframe for this partial tear I then recommend a repeat PRP injection prior to beginning rehabilitation at or around the 8 week  bella to continue jump starting the healing process for this tear.  I explained the patient that PRP is unproven and would be performed to help avoid surgery but not guaranteed.  I explained there is a chance he could still potentially require surgical intervention in the future.    Patient expressed understanding and agreed to plan.     PRP stands for Platelet Rich Plasma and is a Injectate  solution prepared from a patient's own blood that has a high concentration of platelets. Blood is drawn from the patient in the same way that occurs during a routine blood draw. A special machine then extracts platelet growth factors and other natural chemicals from the patient's own blood that are hypothesized to help healing tissue. PRP has been used to help healing tissues since approximately 1999, and specifically, for arthritis and cartilage problems since 2003.     Injection of PRP is recommended under ultrasound to attain visuzlization of needle and injectate into target site. Once injected, PRP begins to clot and within 10 minutes of clotting, the platelets begin to secrete their PDGFs (Platelet derived growth factors) which help to aid healing. Lab studies in petri dishes have shown that these healing factors aid in growth of tissue. There have been a number of studies to show evidence that PRP is better in humans when injected compared to placebo, but there is no definitive evidence. Specifically, PRP injection does not result in relief consistently 100% of the time and is considered experimental. As such, PRP should not be viewed as curative but as a treatment modality that may offer relief.             James R Lachman, M.D.  Attending, Orthopaedic Surgery  [facility]    Achilles Rupture - Nonoperative Treatment Protocol    OVERVIEW:  Week 0: cast, touchdown weightbearing with crutches / walker  Avoid prolonged dependent position (keep foot elevated)  Week 2: CAM walker boot with 3 heel wedges, weightbearing as tolerated  Wear boot AT ALL TIMES  Week 6: CAM walker boot, remove one wedge each week (so down to 2 wedges at week 6, 1 wedge at week 7, 0 wedges at week 8)  Start gentle active ankle range of motion (trace alphabet with toes, circles in both directions)  Week 8: start PT, transition to shoe with heel lift, wean off crutches  PT 2-3 times/week and home exercises  daily  Progress with PT over ~4 months  Week 12: wean off of shoe lift  Week 20 / 5 months: gentle jog  Week 24 / 6 months:  Gradual return to sports as tolerated    DETAILED PHYSICAL THERAPY PROTOCOL:  Phase I: weeks 8 - 12  Goals:  Normalize gait  Progress range of motion  Normalize dorsiflexion, inversion, and eversion ankle strength 5/5  Treatment Recommendations:  Gait training, wean off crutches/cane when gait non-antalgic  Heel lift in shoe to assist non-apprehensive and normalized gait  AROM dorsiflexion/plantarflexion/inversion/eversion  Proprioception training  Isometrics/isotonics: inversion/eversion  Sitting heel rise  PREs plantarflexion/dorsiflexion with knee flexed to 90; after 2 weeks, progress to PREs with knee extended to 0  Leg press  Bike  Alphabet drawing  Retro treadmill  Forward step up program  Underwater treadmill system for gait training  Precautions:  Avoid passive heel cord stretching  Minimum Criteria for Advancement:  Normal gait pattern  Manual muscle grade test of 5/5 for dorsiflexion, inversion, and eversion    Phase II: weeks 12 - 20  Goals:  Restore full functional range of motion  Normalize plantarflexion strength 5/5  Normalize balance  Return to functional activities without pain  Ability to descend stairs  Treatment Recommendations:  Submaximal sport-specific skill development  Proprioception training: BAPS, prop board, foam rollers, trampoline, Neurocom  Isometrics/isotonics: inversion/eversion  Isokinetic plantarflexion/dorsiflexion  Standing heel rise  Aggressive PREs plantarflexion  Progress proximal strengthening (PREs)  [place]aren, Kofi, Versaclimber  Forward step down program  Running in underwater treadmill system  Precautions:  Avoid pain with therapeutic exercise and functional activities  Avoid high loading the Achilles tendon (i.e. aggressive stretching in dorsiflexion with body weight or jumping)  Minimum Criteria for Advancement:  No apprehension with activities  of daily living  Normal flexibility  Adequate strength base shown by ability to perform ten unilateral heel raises  Ability to descend stairs reciprocally  Symmetrical lower extremity balance    Phase III: weeks 20 - 28  Goals:  Demonstrate ability to run forward on a treadmill symptom-free  Average peak torque of 75% with isokinetic testing  Maximize strength and flexibility as to meet all demands of ADLs  Return to functional activity without limitation  Higher level of dynamic activity with lack of apprehension with sport-specific movements  Treatment Recommendations:  Start forward treadmill running  Isokinetic testing and training  Continue lower extremity strengthening and flexibility program  Advance proprioception training with perturbation  Light plyometric training (bilateral jumping activities)  Continue aggressive plantarflexion PREs (emphasize eccentric activity)  Submaximal sport specific skill development drills  Progress Kofi murrell Versaclimber  Continue to progress proximal strengthening of lower extremities (PREs)  Precautions:  No apprehension or pain with dynamic activity  Avoid running or sport activity until adequate strength and flexibility is achieved  Minimum Criteria for Advancement:  Pain free running  Average peak torque of isokinetic test = 75% of non-involved  Normal strength (5/5 throughout ankle)  Sports-specific drills with zero apprehension    Phase IV: Return to Sport (weeks 28 - one year)  Goals:  Lack of apprehension with sports activity  Maximize strength and flexibility as to meet demands of individual's sport activity  Treatment Recommendations:  Advanced functional exercises and agility exercises  Plyometrics  Sport-specific exercises  Isokinetic testing  Functional test assessment (such as vertical jump test)  Precautions:  Avoid pain with therapeutic, functional, and sport activity  Avoid full sport activity until adequate strength and flexibility  Criteria for  Discharge:  Flexibility and strength to accepted levels for sports performance  Lack of apprehension with sport-specific movements  85% limb symmetry with vertical jump test  85% limb symmetry for average peak isokinetic torque (PF/DF/inv/ev)  Independent performance of gym/home exercise program            __________________________________________________________________________    HISTORY OF PRESENT ILLNESS:  Left achilles tendinitis. Chronic intermittent for years. Recurrent flare since May 2024. New injury June 2024. Mri revealed partial tear insertional point.  Patient seen by Dr. Rodríguez 7/24/2024 recommended for a consultation to consider PRP as adjunct of therapy for nonoperative treatment of partial Achilles tendon rupture.    Today, patient continues to have posterior heel pain worse with activity moderate intensity.  He desires to avoid surgical intervention but is open to PRP procedure.  He has been utilizing control ankle motion boot but is not wearing today due to significant rain in the last few days and fear of fall.    Review of Systems      Following history reviewed and update:    Past Medical History:   Diagnosis Date    Allergic     Seasonal    Asthma 1999    Sleep apnea      Past Surgical History:   Procedure Laterality Date    MANDIBLE SURGERY      [place]    MD VASECTOMY UNI/BI SPX W/POSTOP SEMEN EXAMS Right 1/13/2023    Procedure: RIGHT  SCROTUM EXPLORATION;  Surgeon: Sancho Ritchie MD;  Location: [facility] MAIN OR;  Service: Urology    WISDOM TOOTH EXTRACTION       Social History   Social History     Substance and Sexual Activity   Alcohol Use Yes    Comment: Rarely     Social History     Substance and Sexual Activity   Drug Use Never     Social History     Tobacco Use   Smoking Status Never   Smokeless Tobacco Never     Family History   Problem Relation Age of Onset    Diabetes type II Mother     Hypertension Mother     Diabetes Mother         Type 2    Heart disease Father     Diabetes  "type I Maternal Grandmother     Diabetes Maternal Grandmother         Type 1     No Known Allergies       Physical Exam  /97 (BP Location: Right arm, Patient Position: Sitting, Cuff Size: Standard)   Pulse 81   Ht 5' 9\" (1.753 m)   Wt 117 kg (258 lb)   BMI 38.10 kg/m²         Ortho Exam  LEFT ACHILLES EXAMINATION:  No swelling erythema or increased warmth  + Tenderness insertional point Achilles tendon reduced chief complaint of pain  [place]’ [place]:  Palpable Gap or Defect of Achilles: none  Angle of Declination: angle of baseline plantarflexion symmetric to contralateral side  Matles Test (patient prone, intact and symmetric plantarflexion of ankle when flexing knee): intact  Medina's Calf Squeeze Test: intact obligatory plantarflexion    __________________________________________________________________________  Procedures                  "

## 2024-08-09 LAB

## 2024-08-23 ENCOUNTER — TELEPHONE (OUTPATIENT)
Dept: FAMILY MEDICINE CLINIC | Facility: CLINIC | Age: 34
End: 2024-08-23

## 2024-09-05 ENCOUNTER — TELEPHONE (OUTPATIENT)
Dept: FAMILY MEDICINE CLINIC | Facility: CLINIC | Age: 34
End: 2024-09-05

## 2024-09-23 NOTE — PATIENT INSTRUCTIONS
PRP injection performed with tenotomy at site of insertional partial tear of achilles and chronic tendinitis. No tenderness with only minimal fusiform swelling of mid substance of tendon. We will plan to follow non-op protocol for partial achilles tendon tear followed by initiating post prp and tenotomy physical therapy protocol at the 8 week bella. We will consider repeat PRP after partial tear protocol if continues to have pain indicative of chronic tendinitis of the midsubstance.     Prescription narcotic pain control provided to avoid NSAIDs and inhibition of healing process with PRP.       PRP stands for Platelet Rich Plasma and is a Injectate solution prepared from a patient's own blood that has a high concentration of platelets. Blood is drawn from the patient in the same way that occurs during a routine blood draw. A special machine then extracts platelet growth factors and other natural chemicals from the patient's own blood that are hypothesized to help healing tissue. PRP has been used to help healing tissues since approximately 1999, and specifically, for arthritis and cartilage problems since 2003.      Injection of PRP is recommended under ultrasound to attain visuzlization of needle and injectate into target site. Once injected, PRP begins to clot and within 10 minutes of clotting, the platelets begin to secrete their PDGFs (Platelet derived growth factors) which help to aid healing. Lab studies in petri dishes have shown that these healing factors aid in growth of tissue. There have been a number of studies to show evidence that PRP is better in humans when injected compared to placebo, but there is no definitive evidence. Specifically, PRP injection does not result in relief consistently 100% of the time and is considered experimental. As such, PRP should not be viewed as curative but as a treatment modality that may offer relief.           James R Lachman, M.D.  Attending, Orthopaedic Surgery  [facility]  [facility]    Achilles Rupture - Nonoperative Treatment Protocol    OVERVIEW:  Week 0: cast, touchdown weightbearing with crutches / walker  Avoid prolonged dependent position (keep foot elevated)  Week 2: CAM walker boot with 3 heel wedges, weightbearing as tolerated  Wear boot AT ALL TIMES  Week 6: CAM walker boot, remove one wedge each week (so down to 2 wedges at week 6, 1 wedge at week 7, 0 wedges at week 8)  Start gentle active ankle range of motion (trace alphabet with toes, circles in both directions)  Week 8: start PT, transition to shoe with heel lift, wean off crutches  PT 2-3 times/week and home exercises daily  Progress with PT over ~4 months  Week 12: wean off of shoe lift  Week 20 / 5 months: gentle jog  Week 24 / 6 months:  Gradual return to sports as tolerated    DETAILED PHYSICAL THERAPY PROTOCOL:  Phase I: weeks 8 - 12  Goals:  Normalize gait  Progress range of motion  Normalize dorsiflexion, inversion, and eversion ankle strength 5/5  Treatment Recommendations:  Gait training, wean off crutches/cane when gait non-antalgic  Heel lift in shoe to assist non-apprehensive and normalized gait  AROM dorsiflexion/plantarflexion/inversion/eversion  Proprioception training  Isometrics/isotonics: inversion/eversion  Sitting heel rise  PREs plantarflexion/dorsiflexion with knee flexed to 90; after 2 weeks, progress to PREs with knee extended to 0  Leg press  Bike  Alphabet drawing  Retro treadmill  Forward step up program  Underwater treadmill system for gait training  Precautions:  Avoid passive heel cord stretching  Minimum Criteria for Advancement:  Normal gait pattern  Manual muscle grade test of 5/5 for dorsiflexion, inversion, and eversion    Phase II: weeks 12 - 20  Goals:  Restore full functional range of motion  Normalize plantarflexion strength 5/5  Normalize balance  Return to functional activities without pain  Ability to descend stairs  Treatment Recommendations:  Submaximal  sport-specific skill development  Proprioception training: BAPS, prop board, foam rollers, trampoline, Neurocom  Isometrics/isotonics: inversion/eversion  Isokinetic plantarflexion/dorsiflexion  Standing heel rise  Aggressive PREs plantarflexion  Progress proximal strengthening (PREs)  Kofi Murrell Versaclimber  Forward step down program  Running in underwater treadmill system  Precautions:  Avoid pain with therapeutic exercise and functional activities  Avoid high loading the Achilles tendon (i.e. aggressive stretching in dorsiflexion with body weight or jumping)  Minimum Criteria for Advancement:  No apprehension with activities of daily living  Normal flexibility  Adequate strength base shown by ability to perform ten unilateral heel raises  Ability to descend stairs reciprocally  Symmetrical lower extremity balance    Phase III: weeks 20 - 28  Goals:  Demonstrate ability to run forward on a treadmill symptom-free  Average peak torque of 75% with isokinetic testing  Maximize strength and flexibility as to meet all demands of ADLs  Return to functional activity without limitation  Higher level of dynamic activity with lack of apprehension with sport-specific movements  Treatment Recommendations:  Start forward treadmill running  Isokinetic testing and training  Continue lower extremity strengthening and flexibility program  Advance proprioception training with perturbation  Light plyometric training (bilateral jumping activities)  Continue aggressive plantarflexion PREs (emphasize eccentric activity)  Submaximal sport specific skill development drills  Progress Kofi murrell Versaclimber  Continue to progress proximal strengthening of lower extremities (PREs)  Precautions:  No apprehension or pain with dynamic activity  Avoid running or sport activity until adequate strength and flexibility is achieved  Minimum Criteria for Advancement:  Pain free running  Average peak torque of isokinetic test = 75% of  non-involved  Normal strength (5/5 throughout ankle)  Sports-specific drills with zero apprehension    Phase IV: Return to Sport (weeks 28 - one year)  Goals:  Lack of apprehension with sports activity  Maximize strength and flexibility as to meet demands of individual's sport activity  Treatment Recommendations:  Advanced functional exercises and agility exercises  Plyometrics  Sport-specific exercises  Isokinetic testing  Functional test assessment (such as vertical jump test)  Precautions:  Avoid pain with therapeutic, functional, and sport activity  Avoid full sport activity until adequate strength and flexibility  Criteria for Discharge:  Flexibility and strength to accepted levels for sports performance  Lack of apprehension with sport-specific movements  85% limb symmetry with vertical jump test  85% limb symmetry for average peak isokinetic torque (PF/DF/inv/ev)  Independent performance of gym/home exercise program

## 2024-09-23 NOTE — PROGRESS NOTES
1. Achilles tendinitis, left leg  oxyCODONE-acetaminophen (Percocet) 5-325 mg per tablet    Medium joint arthrocentesis: L ankle      2. Partial tear of left Achilles tendon, initial encounter  oxyCODONE-acetaminophen (Percocet) 5-325 mg per tablet    Medium joint arthrocentesis: L ankle        Orders Placed This Encounter   Procedures    Medium joint arthrocentesis: L ankle        IMAGING STUDIES: (I personally reviewed images in PACS and report):         PAST REPORTS:    MRI ankle/heel left wo contrast 07/06/24  Achilles insertional tendinosis with reactive marrow edema at the calcaneus.   There is a partial tear at the insertion that is estimated at 50% thickness and extent and measuring up to 8 mm in transverse dimension.           ASSESSMENT/PLAN:  Chronic Left Achilles Tendinitis  Partial Achilles Insertional Tear    Repeat X-ray next visit: None    Return in about 2 weeks (around 10/8/2024).    Patient instructions below verbally summarized in person during encounter:  Patient Instructions   PRP injection performed with tenotomy at site of insertional partial tear of achilles and chronic tendinitis. No tenderness with only minimal fusiform swelling of mid substance of tendon. We will plan to follow non-op protocol for partial achilles tendon tear followed by initiating post prp and tenotomy physical therapy protocol at the 8 week bella. We will consider repeat PRP after partial tear protocol if continues to have pain indicative of chronic tendinitis of the midsubstance.       PRP stands for Platelet Rich Plasma and is a Injectate solution prepared from a patient's own blood that has a high concentration of platelets. Blood is drawn from the patient in the same way that occurs during a routine blood draw. A special machine then extracts platelet growth factors and other natural chemicals from the patient's own blood that are hypothesized to help healing tissue. PRP has been used to help healing tissues since  approximately 1999, and specifically, for arthritis and cartilage problems since 2003.      Injection of PRP is recommended under ultrasound to attain visuzlization of needle and injectate into target site. Once injected, PRP begins to clot and within 10 minutes of clotting, the platelets begin to secrete their PDGFs (Platelet derived growth factors) which help to aid healing. Lab studies in petri dishes have shown that these healing factors aid in growth of tissue. There have been a number of studies to show evidence that PRP is better in humans when injected compared to placebo, but there is no definitive evidence. Specifically, PRP injection does not result in relief consistently 100% of the time and is considered experimental. As such, PRP should not be viewed as curative but as a treatment modality that may offer relief.           James R Lachman, M.D.  Attending, Orthopaedic Surgery  [facility]    Achilles Rupture - Nonoperative Treatment Protocol    OVERVIEW:  Week 0: cast, touchdown weightbearing with crutches / walker  Avoid prolonged dependent position (keep foot elevated)  Week 2: CAM walker boot with 3 heel wedges, weightbearing as tolerated  Wear boot AT ALL TIMES  Week 6: CAM walker boot, remove one wedge each week (so down to 2 wedges at week 6, 1 wedge at week 7, 0 wedges at week 8)  Start gentle active ankle range of motion (trace alphabet with toes, circles in both directions)  Week 8: start PT, transition to shoe with heel lift, wean off crutches  PT 2-3 times/week and home exercises daily  Progress with PT over ~4 months  Week 12: wean off of shoe lift  Week 20 / 5 months: gentle jog  Week 24 / 6 months:  Gradual return to sports as tolerated    DETAILED PHYSICAL THERAPY PROTOCOL:  Phase I: weeks 8 - 12  Goals:  Normalize gait  Progress range of motion  Normalize dorsiflexion, inversion, and eversion ankle strength 5/5  Treatment Recommendations:  Gait training, wean off  crutches/cane when gait non-antalgic  Heel lift in shoe to assist non-apprehensive and normalized gait  AROM dorsiflexion/plantarflexion/inversion/eversion  Proprioception training  Isometrics/isotonics: inversion/eversion  Sitting heel rise  PREs plantarflexion/dorsiflexion with knee flexed to 90; after 2 weeks, progress to PREs with knee extended to 0  Leg press  Bike  Alphabet drawing  Retro treadmill  Forward step up program  Underwater treadmill system for gait training  Precautions:  Avoid passive heel cord stretching  Minimum Criteria for Advancement:  Normal gait pattern  Manual muscle grade test of 5/5 for dorsiflexion, inversion, and eversion    Phase II: weeks 12 - 20  Goals:  Restore full functional range of motion  Normalize plantarflexion strength 5/5  Normalize balance  Return to functional activities without pain  Ability to descend stairs  Treatment Recommendations:  Submaximal sport-specific skill development  Proprioception training: BAPS, prop board, foam rollers, trampoline, Neurocom  Isometrics/isotonics: inversion/eversion  Isokinetic plantarflexion/dorsiflexion  Standing heel rise  Aggressive PREs plantarflexion  Progress proximal strengthening (PREs)  IS[employer] MediaKofi jones, Versaclimber  Forward step down program  Running in underwater treadmill system  Precautions:  Avoid pain with therapeutic exercise and functional activities  Avoid high loading the Achilles tendon (i.e. aggressive stretching in dorsiflexion with body weight or jumping)  Minimum Criteria for Advancement:  No apprehension with activities of daily living  Normal flexibility  Adequate strength base shown by ability to perform ten unilateral heel raises  Ability to descend stairs reciprocally  Symmetrical lower extremity balance    Phase III: weeks 20 - 28  Goals:  Demonstrate ability to run forward on a treadmill symptom-free  Average peak torque of 75% with isokinetic testing  Maximize strength and flexibility as to meet all  demands of ADLs  Return to functional activity without limitation  Higher level of dynamic activity with lack of apprehension with sport-specific movements  Treatment Recommendations:  Start forward treadmill running  Isokinetic testing and training  Continue lower extremity strengthening and flexibility program  Advance proprioception training with perturbation  Light plyometric training (bilateral jumping activities)  Continue aggressive plantarflexion PREs (emphasize eccentric activity)  Submaximal sport specific skill development drills  Progress bike, Stairmaster, Versaclimber  Continue to progress proximal strengthening of lower extremities (PREs)  Precautions:  No apprehension or pain with dynamic activity  Avoid running or sport activity until adequate strength and flexibility is achieved  Minimum Criteria for Advancement:  Pain free running  Average peak torque of isokinetic test = 75% of non-involved  Normal strength (5/5 throughout ankle)  Sports-specific drills with zero apprehension    Phase IV: Return to Sport (weeks 28 - one year)  Goals:  Lack of apprehension with sports activity  Maximize strength and flexibility as to meet demands of individual's sport activity  Treatment Recommendations:  Advanced functional exercises and agility exercises  Plyometrics  Sport-specific exercises  Isokinetic testing  Functional test assessment (such as vertical jump test)  Precautions:  Avoid pain with therapeutic, functional, and sport activity  Avoid full sport activity until adequate strength and flexibility  Criteria for Discharge:  Flexibility and strength to accepted levels for sports performance  Lack of apprehension with sport-specific movements  85% limb symmetry with vertical jump test  85% limb symmetry for average peak isokinetic torque (PF/DF/inv/ev)  Independent performance of gym/home exercise program         __________________________________________________________________________    HISTORY OF  "PRESENT ILLNESS:    Left achilles tendinitis. Chronic intermittent for years. Recurrent flare since May 2024. New injury June 2024. Mri revealed partial tear insertional point.  Patient seen by Dr. Rodríguez 7/24/2024 recommended for a consultation to consider PRP as adjunct of therapy for nonoperative treatment of partial Achilles tendon rupture.     Today, patient continues to have posterior heel pain worse with activity moderate intensity.  He desires to avoid surgical intervention but is open to PRP procedure.  He has been utilizing control ankle motion boot but is not wearing today due to significant rain in the last few days and fear of fall.    Today, feels slightly improved but still has significant pain when ambulating and placing weight on the affected extremity.    Review of Systems      Following history reviewed and update:    Past Medical History:   Diagnosis Date    Allergic     Seasonal    Asthma 1999    Sleep apnea      Past Surgical History:   Procedure Laterality Date    MANDIBLE SURGERY      [place]    UT VASECTOMY UNI/BI SPX W/POSTOP SEMEN EXAMS Right 1/13/2023    Procedure: RIGHT  SCROTUM EXPLORATION;  Surgeon: Sancho Ritchie MD;  [facility] MAIN [facility];  Service: Urology    WISDOM TOOTH EXTRACTION       Social History   Social History     Substance and Sexual Activity   Alcohol Use Yes    Comment: Rarely     Social History     Substance and Sexual Activity   Drug Use Never     Social History     Tobacco Use   Smoking Status Never   Smokeless Tobacco Never     Family History   Problem Relation Age of Onset    Diabetes type II Mother     Hypertension Mother     Diabetes Mother         Type 2    Heart disease Father     Diabetes type I Maternal Grandmother     Diabetes Maternal Grandmother         Type 1     No Known Allergies       Physical Exam  Ht 5' 9\" (1.753 m)   Wt 117 kg (258 lb)   BMI 38.10 kg/m²         Ortho Exam  Left Calf exam:  No swelling erythema or increased warmth  No " palpable cords  Tenderness: none  Mya Dorsiflexion DVT Test: Negative  (slight knee flexion, passive abrupt ankle dorsiflexion, squeeze calf)    Left foot exam  No swelling, erythema or increased warmth  Tenderness: on palpation of the tendinous insertion of the calcaneous; more prominent on the medial side when compared to the lateral aspect of the ankle.  ROM Toes extension: intact  ROM Toes flexion: intact  Strength Toes: 5/5 flex, ext  Sensation intact  Capillary refill intact    Left Lisfranc Assessment:  Plantar Ecchymosis:  Negative  Pronation Abduction test:  Negative  (forefoot abducted, pronate foot, hindfoot kept in place)  Tarsometatarsal Squeeze test:  Negative  (thumb lateral midfoot, other fingers medial midfoot, squeeze 1st & 5th rays)    __________________________________________________________________________  Medium joint arthrocentesis: L ankle  Universal Protocol:  Consent: Verbal consent obtained.  Risks and benefits: risks, benefits and alternatives were discussed  Patient understanding: patient states understanding of the procedure being performed  Patient consent: the patient's understanding of the procedure matches consent given  Site marked: the operative site was marked  Radiology Images displayed and confirmed. If images not available, report reviewed: imaging studies available  Required items: required blood products, implants, devices, and special equipment available  Patient identity confirmed: verbally with patient  Supporting Documentation  Indications: pain   Procedure Details  Location: ankle - L ankle  Preparation: Patient was prepped and draped in the usual sterile fashion  Needle size: 22 G  Ultrasound guidance: yes (Linear transducer with sterile probe cover and sterile gel)  Approach: posterior  Medications administered: 10 mL bupivacaine 0.25 %    Patient tolerance: patient tolerated the procedure well with no immediate complications  Dressing:  Sterile dressing  applied    PRP for achilles tendin injury. Lidocaine injection for pain control.

## 2024-09-24 ENCOUNTER — PROCEDURE VISIT (OUTPATIENT)
Dept: OBGYN CLINIC | Facility: OTHER | Age: 34
End: 2024-09-24
Payer: COMMERCIAL

## 2024-09-24 VITALS — HEIGHT: 69 IN | BODY MASS INDEX: 38.21 KG/M2 | WEIGHT: 258 LBS

## 2024-09-24 DIAGNOSIS — S86.012A PARTIAL TEAR OF LEFT ACHILLES TENDON, INITIAL ENCOUNTER: ICD-10-CM

## 2024-09-24 DIAGNOSIS — M76.62 ACHILLES TENDINITIS, LEFT LEG: Primary | ICD-10-CM

## 2024-09-24 PROCEDURE — 20606 DRAIN/INJ JOINT/BURSA W/US: CPT

## 2024-09-24 RX ORDER — OXYCODONE AND ACETAMINOPHEN 5; 325 MG/1; MG/1
1 TABLET ORAL EVERY 4 HOURS PRN
Qty: 10 TABLET | Refills: 0 | Status: SHIPPED | OUTPATIENT
Start: 2024-09-24

## 2024-09-24 RX ADMIN — BUPIVACAINE HYDROCHLORIDE 10 ML: 2.5 INJECTION, SOLUTION INFILTRATION; PERINEURAL at 10:00

## 2024-09-27 RX ORDER — BUPIVACAINE HYDROCHLORIDE 2.5 MG/ML
10 INJECTION, SOLUTION INFILTRATION; PERINEURAL
Status: COMPLETED | OUTPATIENT
Start: 2024-09-24 | End: 2024-09-24

## 2024-10-08 ENCOUNTER — OFFICE VISIT (OUTPATIENT)
Dept: OBGYN CLINIC | Facility: OTHER | Age: 34
End: 2024-10-08
Payer: COMMERCIAL

## 2024-10-08 VITALS — BODY MASS INDEX: 38.21 KG/M2 | HEIGHT: 69 IN | WEIGHT: 258 LBS

## 2024-10-08 DIAGNOSIS — S86.012A PARTIAL TEAR OF LEFT ACHILLES TENDON, INITIAL ENCOUNTER: ICD-10-CM

## 2024-10-08 DIAGNOSIS — M76.62 ACHILLES TENDINITIS, LEFT LEG: Primary | ICD-10-CM

## 2024-10-08 PROCEDURE — 99213 OFFICE O/P EST LOW 20 MIN: CPT | Performed by: FAMILY MEDICINE

## 2024-10-08 NOTE — PROGRESS NOTES
1. Achilles tendinitis, left leg  Durable Medical Equipment      2. Partial tear of left Achilles tendon, initial encounter  Durable Medical Equipment        Orders Placed This Encounter   Procedures    Durable Medical Equipment        IMAGING STUDIES: (I personally reviewed images in PACS and report):         PAST REPORTS:    MRI ankle/heel left wo contrast 07/06/24  Achilles insertional tendinosis with reactive marrow edema at the calcaneus.   There is a partial tear at the insertion that is estimated at 50% thickness and extent and measuring up to 8 mm in transverse dimension.           ASSESSMENT/PLAN:  Chronic Left Achilles Tendinitis  Partial Achilles Insertional Tear    Will proceed with Achilles rupture nonoperative treatment protocol --> patient to proceed to week 2 with CAM Walker boot with 3 heel wedges and weightbearing as tolerated to be worn at all times.    Repeat X-ray next visit: None    Return in about 4 weeks (around 11/5/2024) for Reassessment of patient left ankle/achilles tendon with utilization of CAM walking boot.    Patient instructions below verbally summarized in person during encounter:  Patient Instructions   It was discussed with the patient with regards to nonoperative management in the setting of the patient's PRP injections and Achilles insertional tendinitis and tear.  It was described that the patient will now be escalated to utilization of the cam walking boot with 3 heel wedges to help with the healing process as well as support and for him to transition to weightbearing as tolerated in 2 transition out of utilizing the rolling scooter for ambulation.  Patient expressed understanding with this and it was encouraged to him to wear the boot at all times as per the protocol including when sleeping as well as when bathing/showering.  It was expressed that we would want to see the patient at 6 weeks post procedure (4 weeks from today) for reassessment and potential removal of wedges  with his cam walking boot.  Patient expressed understanding with this.    PRP stands for Platelet Rich Plasma and is a Injectate solution prepared from a patient's own blood that has a high concentration of platelets. Blood is drawn from the patient in the same way that occurs during a routine blood draw. A special machine then extracts platelet growth factors and other natural chemicals from the patient's own blood that are hypothesized to help healing tissue. PRP has been used to help healing tissues since approximately 1999, and specifically, for arthritis and cartilage problems since 2003.      Injection of PRP is recommended under ultrasound to attain visuzlization of needle and injectate into target site. Once injected, PRP begins to clot and within 10 minutes of clotting, the platelets begin to secrete their PDGFs (Platelet derived growth factors) which help to aid healing. Lab studies in petri dishes have shown that these healing factors aid in growth of tissue. There have been a number of studies to show evidence that PRP is better in humans when injected compared to placebo, but there is no definitive evidence. Specifically, PRP injection does not result in relief consistently 100% of the time and is considered experimental. As such, PRP should not be viewed as curative but as a treatment modality that may offer relief.           James R Lachman, M.D.  Attending, Orthopaedic Surgery  [facility]    Achilles Rupture - Nonoperative Treatment Protocol    OVERVIEW:  Week 0: cast, touchdown weightbearing with crutches / walker  Avoid prolonged dependent position (keep foot elevated)  Week 2: CAM walker boot with 3 heel wedges, weightbearing as tolerated  Wear boot AT ALL TIMES  Week 6: CAM walker boot, remove one wedge each week (so down to 2 wedges at week 6, 1 wedge at week 7, 0 wedges at week 8)  Start gentle active ankle range of motion (trace alphabet with toes, circles in both  directions)  Week 8: start PT, transition to shoe with heel lift, wean off crutches  PT 2-3 times/week and home exercises daily  Progress with PT over ~4 months  Week 12: wean off of shoe lift  Week 20 / 5 months: gentle jog  Week 24 / 6 months:  Gradual return to sports as tolerated    DETAILED PHYSICAL THERAPY PROTOCOL:  Phase I: weeks 8 - 12  Goals:  Normalize gait  Progress range of motion  Normalize dorsiflexion, inversion, and eversion ankle strength 5/5  Treatment Recommendations:  Gait training, wean off crutches/cane when gait non-antalgic  Heel lift in shoe to assist non-apprehensive and normalized gait  AROM dorsiflexion/plantarflexion/inversion/eversion  Proprioception training  Isometrics/isotonics: inversion/eversion  Sitting heel rise  PREs plantarflexion/dorsiflexion with knee flexed to 90; after 2 weeks, progress to PREs with knee extended to 0  Leg press  Bike  Alphabet drawing  Retro treadmill  Forward step up program  Underwater treadmill system for gait training  Precautions:  Avoid passive heel cord stretching  Minimum Criteria for Advancement:  Normal gait pattern  Manual muscle grade test of 5/5 for dorsiflexion, inversion, and eversion    Phase II: weeks 12 - 20  Goals:  Restore full functional range of motion  Normalize plantarflexion strength 5/5  Normalize balance  Return to functional activities without pain  Ability to descend stairs  Treatment Recommendations:  Submaximal sport-specific skill development  Proprioception training: BAPS, prop board, foam rollers, trampoline, Neurocom  Isometrics/isotonics: inversion/eversion  Isokinetic plantarflexion/dorsiflexion  Standing heel rise  Aggressive PREs plantarflexion  Progress proximal strengthening (PREs)  Kofi Plummer Versaclimber  Forward step down program  Running in underwater treadmill system  Precautions:  Avoid pain with therapeutic exercise and functional activities  Avoid high loading the Achilles tendon (i.e. aggressive  stretching in dorsiflexion with body weight or jumping)  Minimum Criteria for Advancement:  No apprehension with activities of daily living  Normal flexibility  Adequate strength base shown by ability to perform ten unilateral heel raises  Ability to descend stairs reciprocally  Symmetrical lower extremity balance    Phase III: weeks 20 - 28  Goals:  Demonstrate ability to run forward on a treadmill symptom-free  Average peak torque of 75% with isokinetic testing  Maximize strength and flexibility as to meet all demands of ADLs  Return to functional activity without limitation  Higher level of dynamic activity with lack of apprehension with sport-specific movements  Treatment Recommendations:  Start forward treadmill running  Isokinetic testing and training  Continue lower extremity strengthening and flexibility program  Advance proprioception training with perturbation  Light plyometric training (bilateral jumping activities)  Continue aggressive plantarflexion PREs (emphasize eccentric activity)  Submaximal sport specific skill development drills  Progress Kofi murrell Versaclimber  Continue to progress proximal strengthening of lower extremities (PREs)  Precautions:  No apprehension or pain with dynamic activity  Avoid running or sport activity until adequate strength and flexibility is achieved  Minimum Criteria for Advancement:  Pain free running  Average peak torque of isokinetic test = 75% of non-involved  Normal strength (5/5 throughout ankle)  Sports-specific drills with zero apprehension    Phase IV: Return to Sport (weeks 28 - one year)  Goals:  Lack of apprehension with sports activity  Maximize strength and flexibility as to meet demands of individual's sport activity  Treatment Recommendations:  Advanced functional exercises and agility exercises  Plyometrics  Sport-specific exercises  Isokinetic testing  Functional test assessment (such as vertical jump test)  Precautions:  Avoid pain with  therapeutic, functional, and sport activity  Avoid full sport activity until adequate strength and flexibility  Criteria for Discharge:  Flexibility and strength to accepted levels for sports performance  Lack of apprehension with sport-specific movements  85% limb symmetry with vertical jump test  85% limb symmetry for average peak isokinetic torque (PF/DF/inv/ev)  Independent performance of gym/home exercise program       __________________________________________________________________________    HISTORY OF PRESENT ILLNESS:    Patient presents for follow-up evaluation after ultrasound-guided PRP tenotomy and PRP injection of the Achilles tendon with working diagnosis of left Achilles tendinitis and partial Achilles insertional tear.  States that 1 week following the therapy he had worsening of pain but feels that his pain is beginning to improve at this time.  Has been persistent with utilization of splinting as well as rolling scooter to help with pain control.      Has been attempted to be nonweightbearing as possible with regards to previous instructions.  Denies any fevers or chills.  No drainage from the area.  No nausea or vomiting.  No loss of consciousness.    Rates his current pain level as a 3/10.  Patient describes that flares of symptomology will go to a intensity level of 7-10/10.     Overall, he is improved since day of procedure and patient explains that the majority of his complaints are due to the inconvenience of splint, requirement for NWB.     Review of Systems      Following history reviewed and update:    Past Medical History:   Diagnosis Date    Allergic     Seasonal    Asthma 1999    Sleep apnea      Past Surgical History:   Procedure Laterality Date    MANDIBLE SURGERY      [place]    [place] VASECTOMY UNI/BI SPX W/POSTOP SEMEN EXAMS Right 1/13/2023    Procedure: RIGHT  SCROTUM EXPLORATION;  Surgeon: Sancho Ritchie MD;  [facility];  Service: Urology    WISDOM TOOTH  "EXTRACTION       Social History   Social History     Substance and Sexual Activity   Alcohol Use Yes    Comment: Rarely     Social History     Substance and Sexual Activity   Drug Use Never     Social History     Tobacco Use   Smoking Status Never   Smokeless Tobacco Never     Family History   Problem Relation Age of Onset    Diabetes type II Mother     Hypertension Mother     Diabetes Mother         Type 2    Heart disease Father     Diabetes type I Maternal Grandmother     Diabetes Maternal Grandmother         Type 1     No Known Allergies       Physical Exam  Ht 5' 9\" (1.753 m)   Wt 117 kg (258 lb)   BMI 38.10 kg/m²         Ortho Exam  LEFT ACHILLES EXAMINATION:  No swelling erythema or increased warmth  Procedure site healed  Mild tenderness distal achilles insertion calcaneus  Simmonds’ Triad:  Palpable Gap or Defect of Achilles: none  Angle of Declination: angle of baseline plantarflexion symmetric to contralateral side  Matles Test (patient prone, intact and symmetric plantarflexion of ankle when flexing knee): intact  Medina's Calf Squeeze Test: intact obligatory plantarflexion        __________________________________________________________________________  Procedures                    "

## 2024-10-08 NOTE — PATIENT INSTRUCTIONS
It was discussed with the patient with regards to nonoperative management in the setting of the patient's PRP injections and Achilles insertional tendinitis and tear.  It was described that the patient will now be escalated to utilization of the cam walking boot with 3 heel wedges to help with the healing process as well as support and for him to transition to weightbearing as tolerated in 2 transition out of utilizing the rolling scooter for ambulation.  Patient expressed understanding with this and it was encouraged to him to wear the boot at all times as per the protocol including when sleeping as well as when bathing/showering.  It was expressed that we would want to see the patient at 6 weeks post procedure (4 weeks from today) for reassessment and potential removal of wedges with his cam walking boot.  Patient expressed understanding with this.    PRP stands for Platelet Rich Plasma and is a Injectate solution prepared from a patient's own blood that has a high concentration of platelets. Blood is drawn from the patient in the same way that occurs during a routine blood draw. A special machine then extracts platelet growth factors and other natural chemicals from the patient's own blood that are hypothesized to help healing tissue. PRP has been used to help healing tissues since approximately 1999, and specifically, for arthritis and cartilage problems since 2003.      Injection of PRP is recommended under ultrasound to attain visuzlization of needle and injectate into target site. Once injected, PRP begins to clot and within 10 minutes of clotting, the platelets begin to secrete their PDGFs (Platelet derived growth factors) which help to aid healing. Lab studies in petri dishes have shown that these healing factors aid in growth of tissue. There have been a number of studies to show evidence that PRP is better in humans when injected compared to placebo, but there is no definitive evidence. Specifically, PRP  injection does not result in relief consistently 100% of the time and is considered experimental. As such, PRP should not be viewed as curative but as a treatment modality that may offer relief.           James R Lachman, M.D.  Attending, Orthopaedic Surgery  [facility]    Achilles Rupture - Nonoperative Treatment Protocol    OVERVIEW:  Week 0: cast, touchdown weightbearing with crutches / walker  Avoid prolonged dependent position (keep foot elevated)  Week 2: CAM walker boot with 3 heel wedges, weightbearing as tolerated  Wear boot AT ALL TIMES  Week 6: CAM walker boot, remove one wedge each week (so down to 2 wedges at week 6, 1 wedge at week 7, 0 wedges at week 8)  Start gentle active ankle range of motion (trace alphabet with toes, circles in both directions)  Week 8: start PT, transition to shoe with heel lift, wean off crutches  PT 2-3 times/week and home exercises daily  Progress with PT over ~4 months  Week 12: wean off of shoe lift  Week 20 / 5 months: gentle jog  Week 24 / 6 months:  Gradual return to sports as tolerated    DETAILED PHYSICAL THERAPY PROTOCOL:  Phase I: weeks 8 - 12  Goals:  Normalize gait  Progress range of motion  Normalize dorsiflexion, inversion, and eversion ankle strength 5/5  Treatment Recommendations:  Gait training, wean off crutches/cane when gait non-antalgic  Heel lift in shoe to assist non-apprehensive and normalized gait  AROM dorsiflexion/plantarflexion/inversion/eversion  Proprioception training  Isometrics/isotonics: inversion/eversion  Sitting heel rise  PREs plantarflexion/dorsiflexion with knee flexed to 90; after 2 weeks, progress to PREs with knee extended to 0  Leg press  Bike  Alphabet drawing  Retro treadmill  Forward step up program  Underwater treadmill system for gait training  Precautions:  Avoid passive heel cord stretching  Minimum Criteria for Advancement:  Normal gait pattern  Manual muscle grade test of 5/5 for dorsiflexion,  inversion, and eversion    Phase II: weeks 12 - 20  Goals:  Restore full functional range of motion  Normalize plantarflexion strength 5/5  Normalize balance  Return to functional activities without pain  Ability to descend stairs  Treatment Recommendations:  Submaximal sport-specific skill development  Proprioception training: BAPS, prop board, foam rollers, trampoline, Neurocom  Isometrics/isotonics: inversion/eversion  Isokinetic plantarflexion/dorsiflexion  Standing heel rise  Aggressive PREs plantarflexion  Progress proximal strengthening (PREs)  Kofi Murrell Versaclimber  Forward step down program  Running in underwater treadmill system  Precautions:  Avoid pain with therapeutic exercise and functional activities  Avoid high loading the Achilles tendon (i.e. aggressive stretching in dorsiflexion with body weight or jumping)  Minimum Criteria for Advancement:  No apprehension with activities of daily living  Normal flexibility  Adequate strength base shown by ability to perform ten unilateral heel raises  Ability to descend stairs reciprocally  Symmetrical lower extremity balance    Phase III: weeks 20 - 28  Goals:  Demonstrate ability to run forward on a treadmill symptom-free  Average peak torque of 75% with isokinetic testing  Maximize strength and flexibility as to meet all demands of ADLs  Return to functional activity without limitation  Higher level of dynamic activity with lack of apprehension with sport-specific movements  Treatment Recommendations:  Start forward treadmill running  Isokinetic testing and training  Continue lower extremity strengthening and flexibility program  Advance proprioception training with perturbation  Light plyometric training (bilateral jumping activities)  Continue aggressive plantarflexion PREs (emphasize eccentric activity)  Submaximal sport specific skill development drills  Progress Kofi murrell Versaclimber  Continue to progress proximal strengthening of  lower extremities (PREs)  Precautions:  No apprehension or pain with dynamic activity  Avoid running or sport activity until adequate strength and flexibility is achieved  Minimum Criteria for Advancement:  Pain free running  Average peak torque of isokinetic test = 75% of non-involved  Normal strength (5/5 throughout ankle)  Sports-specific drills with zero apprehension    Phase IV: Return to Sport (weeks 28 - one year)  Goals:  Lack of apprehension with sports activity  Maximize strength and flexibility as to meet demands of individual's sport activity  Treatment Recommendations:  Advanced functional exercises and agility exercises  Plyometrics  Sport-specific exercises  Isokinetic testing  Functional test assessment (such as vertical jump test)  Precautions:  Avoid pain with therapeutic, functional, and sport activity  Avoid full sport activity until adequate strength and flexibility  Criteria for Discharge:  Flexibility and strength to accepted levels for sports performance  Lack of apprehension with sport-specific movements  85% limb symmetry with vertical jump test  85% limb symmetry for average peak isokinetic torque (PF/DF/inv/ev)  Independent performance of gym/home exercise program

## 2024-11-06 NOTE — PROGRESS NOTES
1. Partial tear of left Achilles tendon, initial encounter   Physical Therapy      2. Achilles tendinitis, left leg   Physical Therapy        Orders Placed This Encounter   Procedures     Physical Therapy        IMAGING STUDIES: (I personally reviewed images in PACS and report):         PAST REPORTS:  - MRI ankle/heel left wo contrast 07/06/24  Achilles insertional tendinosis with reactive marrow edema at the calcaneus.   There is a partial tear at the insertion that is estimated at 50% thickness and extent and measuring up to 8 mm in transverse dimension.              ASSESSMENT/PLAN:  Chronic Left Achilles Tendinitis  Partial Achilles Insertional Tear  - Patient received ultrasound-guided PRP tenotomy and PRP injection of the insertional Achilles tendon 9/24/2024  Follow-up from procedure: 6 weeks    Repeat X-ray next visit: None    Return in about 8 weeks (around 1/2/2025).    Patient instructions below verbally summarized in person during encounter:  Patient Instructions   Transition to week 6 on protocol.  Begin physical therapy protocol in 2 weeks for post achilles tendinitis PRP & tenotomy with Dr. Manuel Figueroa      Week 6: CAM walker boot, remove one wedge each week (so down to 2 wedges at week 6, 1 wedge at week 7, 0 wedges at week 8) and transition to normal footwear   Start gentle active ankle range of motion (trace alphabet with toes, circles in both directions)  Week 8: start PT, transition to shoe with heel lift  PT 2-3 times/week and home exercises daily      __________________________________________________________________________    HISTORY OF PRESENT ILLNESS:  For evaluation of left Achilles tendinitis and partial insertional tear.  6 weeks out from procedure.  Patient has been compliant with controlled ankle motion boot and 3 heel lifts.  He does note improvement since his procedure day but does still continue to have tenderness to touch along the posterior heel.      Review of  "Systems      Following history reviewed and update:    Past Medical History:   Diagnosis Date    Allergic     Seasonal    Asthma 1999    Sleep apnea      Past Surgical History:   Procedure Laterality Date    MANDIBLE SURGERY      [place]    SD VASECTOMY UNI/BI SPX W/POSTOP SEMEN EXAMS Right 1/13/2023    Procedure: RIGHT  SCROTUM EXPLORATION;  Surgeon: Sancho Ritchie MD;  [facility];  Service: Urology    WISDOM TOOTH EXTRACTION       Social History   Social History     Substance and Sexual Activity   Alcohol Use Yes    Comment: Rarely     Social History     Substance and Sexual Activity   Drug Use Never     Social History     Tobacco Use   Smoking Status Never   Smokeless Tobacco Never     Family History   Problem Relation Age of Onset    Diabetes type II Mother     Hypertension Mother     Diabetes Mother         Type 2    Heart disease Father     Diabetes type I Maternal Grandmother     Diabetes Maternal Grandmother         Type 1     No Known Allergies       Physical Exam  Ht 5' 9\" (1.753 m)   Wt 117 kg (258 lb)   BMI 38.10 kg/m²         Ortho Exam    LEFT ACHILLES EXAMINATION:  No swelling erythema or increased warmth  +mild tenderness at achilles insertion onto calcaneus    Simmonds’ Triad:  Palpable Gap or Defect of Achilles: none  Angle of Declination: angle of baseline plantarflexion symmetric to contralateral side  Matles Test (patient prone, intact and symmetric plantarflexion of ankle when flexing knee): intact  Medina's Calf Squeeze Test: intact obligatory plantarflexion          __________________________________________________________________________  Procedures                   "

## 2024-11-07 ENCOUNTER — OFFICE VISIT (OUTPATIENT)
Dept: OBGYN CLINIC | Facility: OTHER | Age: 34
End: 2024-11-07
Payer: COMMERCIAL

## 2024-11-07 VITALS — BODY MASS INDEX: 38.21 KG/M2 | HEIGHT: 69 IN | WEIGHT: 258 LBS

## 2024-11-07 DIAGNOSIS — S86.012A PARTIAL TEAR OF LEFT ACHILLES TENDON, INITIAL ENCOUNTER: Primary | ICD-10-CM

## 2024-11-07 DIAGNOSIS — M76.62 ACHILLES TENDINITIS, LEFT LEG: ICD-10-CM

## 2024-11-07 PROCEDURE — 99213 OFFICE O/P EST LOW 20 MIN: CPT | Performed by: FAMILY MEDICINE

## 2024-11-07 NOTE — PATIENT INSTRUCTIONS
Transition to week 6 on protocol.  Begin physical therapy protocol in 2 weeks for post achilles tendinitis PRP & tenotomy with Dr. Manuel Figueroa      Week 6: CAM walker boot, remove one wedge each week (so down to 2 wedges at week 6, 1 wedge at week 7, 0 wedges at week 8) and transition to normal footwear   Start gentle active ankle range of motion (trace alphabet with toes, circles in both directions)  Week 8: start PT, transition to shoe with heel lift  PT 2-3 times/week and home exercises daily

## 2024-11-07 NOTE — LETTER
November 7, 2024     Patient: Dieter Camargo  YOB: 1990  Date of Visit: 11/7/2024      To Whom it May Concern:    Dieter Camargo is under my professional care. Dieter was seen in my office on 11/7/2024. Dieter may return to work on 11/8/24 .    If you have any questions or concerns, please don't hesitate to call.         Sincerely,          Petar Mark III, DO        CC: No Recipients

## 2024-11-18 DIAGNOSIS — R06.2 WHEEZE: ICD-10-CM

## 2024-11-19 RX ORDER — ALBUTEROL SULFATE 90 UG/1
2 INHALANT RESPIRATORY (INHALATION) EVERY 6 HOURS PRN
Qty: 6.7 G | Refills: 5 | Status: SHIPPED | OUTPATIENT
Start: 2024-11-19

## 2024-11-21 ENCOUNTER — APPOINTMENT (OUTPATIENT)
Dept: PHYSICAL THERAPY | Facility: OTHER | Age: 34
End: 2024-11-21
Payer: COMMERCIAL

## 2024-11-25 ENCOUNTER — APPOINTMENT (OUTPATIENT)
Dept: PHYSICAL THERAPY | Facility: OTHER | Age: 34
End: 2024-11-25
Payer: COMMERCIAL

## 2024-11-29 ENCOUNTER — EVALUATION (OUTPATIENT)
Dept: PHYSICAL THERAPY | Facility: OTHER | Age: 34
End: 2024-11-29
Payer: COMMERCIAL

## 2024-11-29 DIAGNOSIS — M76.62 LEFT ACHILLES TENDINITIS: Primary | ICD-10-CM

## 2024-11-29 PROCEDURE — 97110 THERAPEUTIC EXERCISES: CPT | Performed by: PHYSICAL THERAPIST

## 2024-11-29 PROCEDURE — 97112 NEUROMUSCULAR REEDUCATION: CPT | Performed by: PHYSICAL THERAPIST

## 2024-11-29 PROCEDURE — 97161 PT EVAL LOW COMPLEX 20 MIN: CPT | Performed by: PHYSICAL THERAPIST

## 2024-11-29 NOTE — PROGRESS NOTES
PT Evaluation     Today's date: 2024  Patient name: Dieter Camargo  : 1990  MRN: [id number]  Referring provider: Petar Mark  Dx: No diagnosis found.               Assessment  Impairments: abnormal coordination, abnormal muscle firing, abnormal or restricted ROM, activity intolerance, impaired physical strength, lacks appropriate home exercise program, pain with function and poor body mechanics  Symptom irritability: moderate    Assessment details: Dieter Camargo is a pleasant 34 y.o. male who presents with S/P PRP injection 24. He reported that he D/c the boot about 1 week ago.. he reported mild pain at this time. He is still avoiding using the LE. Pain with staiding in  one place. Walking he will have pain is on his feet a long time. WE DISCUSSED BLOOD RESTRICTION. No past medical history that would limit our use. He has a history of partial achilles tear. We will slowly increase load utilizing BFR   HEP issued and POC reviewed.     Understanding of Dx/Px/POC: good     Prognosis: good    Goals  Short Term:  Pt will report decreased levels of pain by at least 2 subjective ratings in 4 weeks  Pt will demonstrate improved ROM by at least 10 degrees in 4 weeks  Pt will demonstrate improved strength by 1/2 grade  Long Term:   Pt will be independent in their HEP in 8 weeks  Pt will be be pain free with IADL's  Pt will demonstrate improved FOTO, > 80         Plan  Patient would benefit from: skilled physical therapy  Planned modality interventions: thermotherapy: hydrocollator packs    Planned therapy interventions: activity modification, joint mobilization, manual therapy, motor coordination training, neuromuscular re-education, patient education, self care, therapeutic activities, therapeutic exercise, graded activity and home exercise program    Frequency: 2x week  Treatment plan discussed with: patient  Plan details: Prognosis above is given PT services 2x/week tapering to 1x/week  over the next 3 months and home program adherence.        Subjective Evaluation    Patient Goals  Patient goals for therapy: decreased pain, independence with ADLs/IADLs, return to sport/leisure activities, increased motion and increased strength    Pain  At best pain ratin  At worst pain ratin        Objective     General Comments:      Ankle/Foot Comments   L ankle : 4/5 mmt mild pain with Plantar flexion    Tight PF and and limited DF 5 degree     TTP along the insertion for the achilles. Mild at this time.     No swelling noted.                Precautions: partial Achilles tear, follow PRP protocol       Manuals             PROM RL ankle                                                     Neuro Re-Ed             PF TB green BFR  10x3             Minisqaut  BFR 10x3             Slider BFR  5x3                                                                 Ther Ex                                                                                                                     Ther Activity                                       Gait Training                                       Modalities

## 2024-12-02 ENCOUNTER — OFFICE VISIT (OUTPATIENT)
Dept: PHYSICAL THERAPY | Facility: OTHER | Age: 34
End: 2024-12-02
Payer: COMMERCIAL

## 2024-12-02 DIAGNOSIS — M76.62 LEFT ACHILLES TENDINITIS: Primary | ICD-10-CM

## 2024-12-02 PROCEDURE — 97112 NEUROMUSCULAR REEDUCATION: CPT | Performed by: PHYSICAL THERAPIST

## 2024-12-02 PROCEDURE — 97110 THERAPEUTIC EXERCISES: CPT | Performed by: PHYSICAL THERAPIST

## 2024-12-02 PROCEDURE — 97140 MANUAL THERAPY 1/> REGIONS: CPT | Performed by: PHYSICAL THERAPIST

## 2024-12-02 NOTE — PROGRESS NOTES
Daily Note     Today's date: 2024  Patient name: Dieter Camargo  : 1990  MRN: [id number]  Referring provider: Petar Mark  Dx: No diagnosis found.               Subjective: only muslce soreness after last visit able to progress exercises last visit.     Objective: See treatment diary below      Assessment: Tolerated treatment well. Patient demonstrated fatigue post treatment      Plan: Continue per plan of care.      Precautions: partial Achilles tear, follow PRP protocol       Manuals 12.2            PROM RL ankle  MJ                                                   Neuro Re-Ed             PF TB BFR  10x4 red             Minisqaut  BFR 10x3             Slider BFR  5x3             HR on Leg press  TR #80 10x4 BFR             Std TR  10x3             Marching on foam  30x                          Ther Ex             Gastroc stretch  30''x3             Bike              Std gastroc stretch                                                                               Ther Activity                                       Gait Training                                       Modalities

## 2024-12-04 ENCOUNTER — OFFICE VISIT (OUTPATIENT)
Dept: PHYSICAL THERAPY | Facility: OTHER | Age: 34
End: 2024-12-04
Payer: COMMERCIAL

## 2024-12-04 DIAGNOSIS — M76.62 LEFT ACHILLES TENDINITIS: Primary | ICD-10-CM

## 2024-12-04 PROCEDURE — 97140 MANUAL THERAPY 1/> REGIONS: CPT | Performed by: PHYSICAL THERAPIST

## 2024-12-04 PROCEDURE — 97112 NEUROMUSCULAR REEDUCATION: CPT | Performed by: PHYSICAL THERAPIST

## 2024-12-04 PROCEDURE — 97110 THERAPEUTIC EXERCISES: CPT | Performed by: PHYSICAL THERAPIST

## 2024-12-04 NOTE — PROGRESS NOTES
Daily Note     Today's date: 2024  Patient name: Dieter Camargo  : 1990  MRN: [id number]  Referring provider: Petar Mark  Dx: No diagnosis found.               Subjective: only muscle soreness no TTP along the acillies.     Objective: See treatment diary below      Assessment: Tolerated treatment well. Patient demonstrated fatigue post treatment      Plan: Continue per plan of care.      Precautions: partial Achilles tear, follow PRP protocol       Manuals 12.2 12.4           PROM L ankle  MJ MJ                                                   Neuro Re-Ed             PF TB BFR  10x4 red  15x4            Minisqaut  BFR 10x3  10x4 bfr            Slider BFR  5x3             HR on Leg press  TR #80 10x4 BFR  Tr #90 10x4 bfr            Std TR  10x3  10x3            Marching on foam  30x  30            Wobble board   30            Step down   4in 10x2             Ther Ex             Gastroc stretch  30''x3  30''x3 towel            Bike   5min            Std gastroc stretch                                                                               Ther Activity                                       Gait Training                                       Modalities

## 2024-12-09 ENCOUNTER — OFFICE VISIT (OUTPATIENT)
Dept: PHYSICAL THERAPY | Facility: OTHER | Age: 34
End: 2024-12-09
Payer: COMMERCIAL

## 2024-12-09 DIAGNOSIS — M76.62 LEFT ACHILLES TENDINITIS: Primary | ICD-10-CM

## 2024-12-09 PROCEDURE — 97140 MANUAL THERAPY 1/> REGIONS: CPT | Performed by: PHYSICAL THERAPIST

## 2024-12-09 PROCEDURE — 97112 NEUROMUSCULAR REEDUCATION: CPT | Performed by: PHYSICAL THERAPIST

## 2024-12-09 PROCEDURE — 97110 THERAPEUTIC EXERCISES: CPT | Performed by: PHYSICAL THERAPIST

## 2024-12-09 NOTE — PROGRESS NOTES
Daily Note     Today's date: 2024  Patient name: Dieter Camargo  : 1990  MRN: [id number]  Referring provider: Petar Mark  Dx:   Encounter Diagnosis     ICD-10-CM    1. Left Achilles tendinitis  M76.62                      Subjective: mild achilles soreness today       Objective: See treatment diary below      Assessment: Tolerated treatment well. Patient demonstrated fatigue post treatment      Plan: Continue per plan of care.      Precautions: partial Achilles tear, follow PRP protocol       Manuals 12.2 12.4 12.9          PROM L ankle  MJ MJ  MJ                                                  Neuro Re-Ed             PF TB BFR  10x4 red  15x4  BFR reps           HR std    10x2           Minisqaut  BFR 10x3  10x4 bfr            Slider BFR  5x3   Bfr 5x4           HR on Leg press  TR #80 10x4 BFR  Tr #90 10x4 bfr  #100 15x4           Std TR  10x3  10x3  On wedge 10x3           Marching on foam  30x  30  30           Wobble board   30  30           Step down   4in 10x2   6in 10x2          Ther Ex             Gastroc stretch  30''x3  30''x3 towel  30''x3           Bike   5min  5min           Std gastroc stretch                                                                               Ther Activity                                       Gait Training                                       Modalities

## 2024-12-11 ENCOUNTER — OFFICE VISIT (OUTPATIENT)
Dept: PHYSICAL THERAPY | Facility: OTHER | Age: 34
End: 2024-12-11
Payer: COMMERCIAL

## 2024-12-11 DIAGNOSIS — M76.62 LEFT ACHILLES TENDINITIS: Primary | ICD-10-CM

## 2024-12-11 PROCEDURE — 97140 MANUAL THERAPY 1/> REGIONS: CPT | Performed by: PHYSICAL THERAPIST

## 2024-12-11 PROCEDURE — 97112 NEUROMUSCULAR REEDUCATION: CPT | Performed by: PHYSICAL THERAPIST

## 2024-12-11 NOTE — PROGRESS NOTES
Daily Note     Today's date: 2024  Patient name: Dieter Camargo  : 1990  MRN: [id number]  Referring provider: Petar Mark  Dx:   Encounter Diagnosis     ICD-10-CM    1. Left Achilles tendinitis  M76.62                      Subjective: mild soreness today tolerating PT very well.       Objective: See treatment diary below      Assessment: Tolerated treatment well. Patient demonstrated fatigue post treatment      Plan: Continue per plan of care.      Precautions: partial Achilles tear, follow PRP protocol       Manuals 12.2 12.4 12.9 12.11         PROM L ankle  MJ MJ  MJ  MJ                                                Neuro Re-Ed             PF TB BFR  10x4 red  15x4  BFR reps  Bf reps          HR std    10x2  10x3          Minisqaut  BFR 10x3  10x4 bfr   BFR 10x4          Slider BFR  5x3   Bfr 5x4  Bfr 5x4          HR on Leg press  TR #80 10x4 BFR  Tr #90 10x4 bfr  #100 15x4  BFR #110 15x4          Std TR  10x3  10x3  On wedge 10x3  On wedge 10x3          Marching on foam  30x  30  30  30          Wobble board   30  30  30          Step down   4in 10x2   6in 10x2 6in  10x2          Ther Ex             Gastroc stretch  30''x3  30''x3 towel  30''x3  30''x3          Bike   5min  5min  5min          Std gastroc stretch     30''x3                                                                           Ther Activity                                       Gait Training                                       Modalities

## 2024-12-12 ENCOUNTER — APPOINTMENT (OUTPATIENT)
Dept: PHYSICAL THERAPY | Facility: OTHER | Age: 34
End: 2024-12-12
Payer: COMMERCIAL

## 2024-12-16 ENCOUNTER — OFFICE VISIT (OUTPATIENT)
Dept: PHYSICAL THERAPY | Facility: OTHER | Age: 34
End: 2024-12-16
Payer: COMMERCIAL

## 2024-12-16 DIAGNOSIS — M76.62 LEFT ACHILLES TENDINITIS: Primary | ICD-10-CM

## 2024-12-16 PROCEDURE — 97112 NEUROMUSCULAR REEDUCATION: CPT | Performed by: PHYSICAL THERAPIST

## 2024-12-16 PROCEDURE — 97140 MANUAL THERAPY 1/> REGIONS: CPT | Performed by: PHYSICAL THERAPIST

## 2024-12-16 NOTE — PROGRESS NOTES
Daily Note     Today's date: 2024  Patient name: Deiter Camargo  : 1990  MRN: [id number]  Referring provider: Petar Mark  Dx:   Encounter Diagnosis     ICD-10-CM    1. Left Achilles tendinitis  M76.62                      Subjective: tolerated progression well       Objective: See treatment diary below      Assessment: Tolerated treatment well. Patient demonstrated fatigue post treatment      Plan: Continue per plan of care.      Precautions: partial Achilles tear, follow PRP protocol       Manuals 12.2 12.4 12.9 12.11 12.16        PROM L ankle  MJ MJ  MJ  MJ MJ                                               Neuro Re-Ed             PF TB BFR  10x4 red  15x4  BFR reps  Bf reps  D/C         HR std    10x2  10x3  10x3 BFR         Minisqaut  BFR 10x3  10x4 bfr   BFR 10x4  10x4 BFR         Slider BFR  5x3   Bfr 5x4  Bfr 5x4  5x4 BFR         HR on Leg press  TR #80 10x4 BFR  Tr #90 10x4 bfr  #100 15x4  BFR #110 15x4  #110 15x4 bfr         Std TR  10x3  10x3  On wedge 10x3  On wedge 10x3  10x3         Marching on foam  30x  30  30  30  30         Rocker board      Ml;/ap 30x and balance 10''x5         Wobble board   30  30  30  30         Step down   4in 10x2   6in 10x2 6in  10x2  6in 1-x2         Ther Ex             Gastroc stretch  30''x3  30''x3 towel  30''x3  30''x3  30''x3         Bike   5min  5min  5min  5min         Std gastroc stretch     30''x3  30''x3                                                                          Ther Activity                                       Gait Training                                       Modalities

## 2024-12-19 ENCOUNTER — OFFICE VISIT (OUTPATIENT)
Dept: PHYSICAL THERAPY | Facility: OTHER | Age: 34
End: 2024-12-19
Payer: COMMERCIAL

## 2024-12-19 DIAGNOSIS — M76.62 LEFT ACHILLES TENDINITIS: Primary | ICD-10-CM

## 2024-12-19 PROCEDURE — 97112 NEUROMUSCULAR REEDUCATION: CPT

## 2024-12-19 PROCEDURE — 97140 MANUAL THERAPY 1/> REGIONS: CPT

## 2024-12-19 NOTE — PROGRESS NOTES
Daily Note     Today's date: 2024  Patient name: Dieter Camargo  : 1990  MRN: [id number]  Referring provider: Petar Mark  Dx:   Encounter Diagnosis     ICD-10-CM    1. Left Achilles tendinitis  M76.62             Start Time: 0800  Stop Time: 0845  Total time in clinic (min): 45 minutes    Subjective:  Notes no ankle pain prior to start of session. States that he had only mild soreness post LV.       Objective: See treatment diary below      Assessment: Tolerated treatment well. Patient demonstrated fatigue post treatment and would benefit from continued PT. No adverse effects to manuals stretching.      Plan: Continue per plan of care.      Precautions: partial Achilles tear, follow PRP protocol          Manuals 12.2 12.4 12.9 12.11 12.16  FOTO DUE      PROM L ankle  MJ MJ  MJ  MJ MJ LQ                                              Neuro Re-Ed             PF TB BFR  10x4 red  15x4  BFR reps  Bf reps  D/C  --       HR std    10x2  10x3  10x3 BFR  15x3 BFR        Minisqaut  BFR 10x3  10x4 bfr   BFR 10x4  10x4 BFR  10x4 BFR        Slider BFR  5x3   Bfr 5x4  Bfr 5x4  5x4 BFR  5x4 BFR        HR on Leg press  TR #80 10x4 BFR  Tr #90 10x4 bfr  #100 15x4  BFR #110 15x4  #110 15x4 bfr  #110 15x4 bfr        Std TR  10x3  10x3  On wedge 10x3  On wedge 10x3  10x3  10x3       Marching on foam  30x  30  30  30  30  30       Rocker board      Ml;/ap 30x and balance 10''x5  [place] 30x ea       Wobble board   30  30  30  30         Step down   4in 10x2   6in 10x2 6in  10x2  6in 1-x2  6in 2x10       Ther Ex             Gastroc stretch  30''x3  30''x3 towel  30''x3  30''x3  30''x3  30''x3        Bike   5min  5min  5min  5min  5 min lvl 1       Std gastroc stretch     30''x3  30''x3                                                                          Ther Activity                                       Gait Training                                       Modalities

## 2024-12-23 ENCOUNTER — APPOINTMENT (OUTPATIENT)
Dept: PHYSICAL THERAPY | Facility: OTHER | Age: 34
End: 2024-12-23
Payer: COMMERCIAL

## 2024-12-27 ENCOUNTER — OFFICE VISIT (OUTPATIENT)
Dept: PHYSICAL THERAPY | Facility: OTHER | Age: 34
End: 2024-12-27
Payer: COMMERCIAL

## 2024-12-27 DIAGNOSIS — M76.62 LEFT ACHILLES TENDINITIS: Primary | ICD-10-CM

## 2024-12-27 PROCEDURE — 97110 THERAPEUTIC EXERCISES: CPT | Performed by: PHYSICAL THERAPIST

## 2024-12-27 PROCEDURE — 97140 MANUAL THERAPY 1/> REGIONS: CPT | Performed by: PHYSICAL THERAPIST

## 2024-12-27 PROCEDURE — 97112 NEUROMUSCULAR REEDUCATION: CPT | Performed by: PHYSICAL THERAPIST

## 2024-12-27 NOTE — PROGRESS NOTES
Daily Note     Today's date: 2024  Patient name: Dieter Camargo  : 1990  MRN: [id number]  Referring provider: Petar Mark  Dx:   Encounter Diagnosis     ICD-10-CM    1. Left Achilles tendinitis  M76.62                      Subjective:mild pain. Tolerating strenghting well. No soreness after todays session.       Objective: See treatment diary below      Assessment: Tolerated treatment well. Patient demonstrated fatigue post treatment      Plan: Continue per plan of care.      Precautions: partial Achilles tear, follow PRP protocol          Manuals 12.2 12.4 12.9 12.11 12.16       PROM L ankle  MJ MJ  MJ  MJ MJ LQ MJ                                              Neuro Re-Ed             PF TB BFR  10x4 red  15x4  BFR reps  Bf reps  D/C  --       HR std    10x2  10x3  10x3 BFR  15x3 BFR  15x4      Minisqaut  BFR 10x3  10x4 bfr   BFR 10x4  10x4 BFR  10x4 BFR  #10 10x4 bfr       Slider BFR 3 3way  5x3   Bfr 5x4  Bfr 5x4  5x4 BFR  5x4 BFR  5x 4 BFR       HR on Leg press  TR #80 10x4 BFR  Tr #90 10x4 bfr  #100 15x4  BFR #110 15x4  #110 15x4 bfr  #110 15x4 bfr  #125# 15x4       Std TR  10x3  10x3  On wedge 10x3  On wedge 10x3  10x3  10x3 10x3 on inclide       Marching on foam  30x  30  30  30  30  30 30       Rocker board      Ml;/ap 30x and balance 10''x5  [place] 30x ea 20x2 ball toss       Wobble board   30  30  30  30         Step down   4in 10x2   6in 10x2 6in  10x2  6in 1-x2  6in 2x10 10x2 6in       Ther Ex             Gastroc stretch  30''x3  30''x3 towel  30''x3  30''x3  30''x3  30''x3  30''x3       Bike   5min  5min  5min  5min  5 min lvl 1 5min       Std gastroc stretch     30''x3  30''x3   30''x3                                                                        Ther Activity                                       Gait Training                                       Modalities

## 2024-12-31 ENCOUNTER — OFFICE VISIT (OUTPATIENT)
Dept: PHYSICAL THERAPY | Facility: OTHER | Age: 34
End: 2024-12-31
Payer: COMMERCIAL

## 2024-12-31 DIAGNOSIS — M76.62 LEFT ACHILLES TENDINITIS: Primary | ICD-10-CM

## 2024-12-31 PROCEDURE — 97112 NEUROMUSCULAR REEDUCATION: CPT | Performed by: PHYSICAL THERAPIST

## 2024-12-31 PROCEDURE — 97140 MANUAL THERAPY 1/> REGIONS: CPT | Performed by: PHYSICAL THERAPIST

## 2024-12-31 NOTE — PROGRESS NOTES
Daily Note     Today's date: 2024  Patient name: Dieter Camargo  : 1990  MRN: [id number]  Referring provider: Petar Mark  Dx:   Encounter Diagnosis     ICD-10-CM    1. Left Achilles tendinitis  M76.62                      Subjective:mild pain. Tolerating strenghting well. No soreness after todays session. Progress with step down using BFR       Objective: See treatment diary below      Assessment: Tolerated treatment well. Patient demonstrated fatigue post treatment      Plan: Continue per plan of care.      Precautions: partial Achilles tear, follow PRP protocol          Manuals 12.2 12.4 12.9 12.11 12.16  12.31.24     PROM L ankle  MJ MJ  MJ  MJ MJ LQ MJ  MJ                                             Neuro Re-Ed             PF TB BFR  10x4 red  15x4  BFR reps  Bf reps  D/C  --       HR std    10x2  10x3  10x3 BFR  15x3 BFR  15x4 15x4 on wedge BFR      Minisqaut  BFR 10x3  10x4 bfr   BFR 10x4  10x4 BFR  10x4 BFR  #10 10x4 bfr  #15 15x4 BFR      Slider BFR 3 3way  5x3   Bfr 5x4  Bfr 5x4  5x4 BFR  5x4 BFR  5x 4 BFR  5x3 no BFR      HR on Leg press  TR #80 10x4 BFR  Tr #90 10x4 bfr  #100 15x4  BFR #110 15x4  #110 15x4 bfr  #110 15x4 bfr  #125# 15x4  130# 15x43     Std TR  10x3  10x3  On wedge 10x3  On wedge 10x3  10x3  10x3 10x3 on inclide  10x3     Marching on foam  30x  30  30  30  30  30 30  30      Rocker board      Ml;/ap 30x and balance 10''x5  [place] 30x ea 20x2 ball toss  20x2 ball toss      Wobble board   30  30  30  30         Step down   4in 10x2   6in 10x2 6in  10x2  6in 1-x2  6in 2x10 10x2 6in  10x3 6in BFR      Ther Ex             Gastroc stretch  30''x3  30''x3 towel  30''x3  30''x3  30''x3  30''x3  30''x3  30''x3     Bike   5min  5min  5min  5min  5 min lvl 1 5min  5min      Std gastroc stretch     30''x3  30''x3   30''x3  30''x3                                                                       Ther Activity                                       Gait  Training                                       Modalities

## 2025-01-02 ENCOUNTER — OFFICE VISIT (OUTPATIENT)
Dept: OBGYN CLINIC | Facility: OTHER | Age: 35
End: 2025-01-02
Payer: COMMERCIAL

## 2025-01-02 VITALS — WEIGHT: 258 LBS | HEIGHT: 69 IN | BODY MASS INDEX: 38.21 KG/M2

## 2025-01-02 DIAGNOSIS — S86.012A PARTIAL TEAR OF LEFT ACHILLES TENDON, INITIAL ENCOUNTER: ICD-10-CM

## 2025-01-02 DIAGNOSIS — M76.62 ACHILLES TENDINITIS, LEFT LEG: Primary | ICD-10-CM

## 2025-01-02 PROCEDURE — 99213 OFFICE O/P EST LOW 20 MIN: CPT | Performed by: FAMILY MEDICINE

## 2025-01-02 NOTE — PROGRESS NOTES
1. Achilles tendinitis, left leg        2. Partial tear of left Achilles tendon, initial encounter            No orders of the defined types were placed in this encounter.       IMAGING STUDIES: (I personally reviewed images in PACS and report):         PAST REPORTS:  - MRI ankle/heel left wo contrast 07/06/24  Achilles insertional tendinosis with reactive marrow edema at the calcaneus.   There is a partial tear at the insertion that is estimated at 50% thickness and extent and measuring up to 8 mm in transverse dimension.              ASSESSMENT/PLAN:  Chronic Left Achilles Tendinitis  Partial Achilles Insertional Tear  - Patient received ultrasound-guided PRP tenotomy and PRP injection of the insertional Achilles tendon 9/24/2024  Follow-up from procedure: 14 weeks, 6-7 weeks from cessation of cam boot and beginning PT.     Repeat X-ray next visit: None    Return in about 6 weeks (around 2/13/2025).    Patient instructions below verbally summarized in person during encounter:  Patient Instructions   Continue physical therapy protocol for second 6 week phase  Cease all shock wave therapy ESW at this time  May increase strengthening exercises      __________________________________________________________________________    HISTORY OF PRESENT ILLNESS:    LAST VISIT  For evaluation of left Achilles tendinitis and partial insertional tear.  6 weeks out from procedure.  Patient has been compliant with controlled ankle motion boot and 3 heel lifts.  He does note improvement since his procedure day but does still continue to have tenderness to touch along the posterior heel.    F/U Visit 1/2/24  Today feels that achilles tendon insertion pain is 90% overall. Mild tenderness 1/10 today compared to previous 5/10 previously. He was able to run for a package in his home 10 feet with no pain.       Review of Systems      Following history reviewed and update:    Past Medical History:   Diagnosis Date    Allergic     Seasonal  "   Asthma 1999    Sleep apnea      Past Surgical History:   Procedure Laterality Date    MANDIBLE SURGERY      [place]    NC VASECTOMY UNI/BI SPX W/POSTOP SEMEN EXAMS Right 1/13/2023    Procedure: RIGHT  SCROTUM EXPLORATION;  Surgeon: Sancho Ritchie MD;  Location: [facility] MAIN OR;  Service: Urology    WISDOM TOOTH EXTRACTION       Social History   Social History     Substance and Sexual Activity   Alcohol Use Yes    Comment: Rarely     Social History     Substance and Sexual Activity   Drug Use Never     Social History     Tobacco Use   Smoking Status Never   Smokeless Tobacco Never     Family History   Problem Relation Age of Onset    Diabetes type II Mother     Hypertension Mother     Diabetes Mother         Type 2    Heart disease Father     Diabetes type I Maternal Grandmother     Diabetes Maternal Grandmother         Type 1     No Known Allergies       Physical Exam  Ht 5' 9\" (1.753 m)   Wt 117 kg (258 lb)   BMI 38.10 kg/m²         Ortho Exam    LEFT ACHILLES EXAMINATION:  Mild tenderness 1/10 insertion point  Simmonds’ Triad:  Palpable Gap or Defect of Achilles: none  Angle of Declination: angle of baseline plantarflexion symmetric to contralateral side  Matles Test (patient prone, intact and symmetric plantarflexion of ankle when flexing knee): intact  Medina's Calf Squeeze Test: intact obligatory plantarflexion      __________________________________________________________________________  Procedures                   "

## 2025-01-02 NOTE — PATIENT INSTRUCTIONS
Continue physical therapy protocol for second 6 week phase  Cease all shock wave therapy ESW at this time  May increase strengthening exercises

## 2025-01-03 ENCOUNTER — APPOINTMENT (OUTPATIENT)
Dept: PHYSICAL THERAPY | Facility: OTHER | Age: 35
End: 2025-01-03
Payer: COMMERCIAL

## 2025-01-07 ENCOUNTER — APPOINTMENT (OUTPATIENT)
Dept: PHYSICAL THERAPY | Facility: OTHER | Age: 35
End: 2025-01-07
Payer: COMMERCIAL

## 2025-01-10 ENCOUNTER — OFFICE VISIT (OUTPATIENT)
Dept: PHYSICAL THERAPY | Facility: OTHER | Age: 35
End: 2025-01-10
Payer: COMMERCIAL

## 2025-01-10 DIAGNOSIS — M76.62 LEFT ACHILLES TENDINITIS: Primary | ICD-10-CM

## 2025-01-10 PROCEDURE — 97140 MANUAL THERAPY 1/> REGIONS: CPT | Performed by: PHYSICAL THERAPIST

## 2025-01-10 PROCEDURE — 97112 NEUROMUSCULAR REEDUCATION: CPT | Performed by: PHYSICAL THERAPIST

## 2025-01-11 NOTE — PROGRESS NOTES
Daily Note     Today's date: 2025  Patient name: Dieter Camargo  : 1990  MRN: [id number]  Referring provider: Petar Mark*  Dx: No diagnosis found.               Subjective: progressing well overall feeling about 70% better.       Objective: See treatment diary below      Assessment: Tolerated treatment well. Patient demonstrated fatigue post treatment      Plan: Continue per plan of care.      Precautions: partial Achilles tear, follow PRP protocol          Manuals 12.2 12.4 12.9 12.11 12.16  12.31.24 1.10    PROM L ankle  MJ MJ  MJ  MJ MJ LQ MJ  MJ  MJ                                           Neuro Re-Ed             PF TB BFR  10x4 red  15x4  BFR reps  Bf reps  D/C  --       HR std    10x2  10x3  10x3 BFR  15x3 BFR  15x4 15x4 on wedge BFR  15x4 on wedge     Minisqaut  BFR 10x3  10x4 bfr   BFR 10x4  10x4 BFR  10x4 BFR  #10 10x4 bfr  #15 15x4 BFR  #15 15x4     Slider BFR 3 3way  5x3   Bfr 5x4  Bfr 5x4  5x4 BFR  5x4 BFR  5x 4 BFR  5x3 no BFR  5x3     HR on Leg press  TR #80 10x4 BFR  Tr #90 10x4 bfr  #100 15x4  BFR #110 15x4  #110 15x4 bfr  #110 15x4 bfr  #125# 15x4  130# 15x43 #140 15x4     Std TR  10x3  10x3  On wedge 10x3  On wedge 10x3  10x3  10x3 10x3 on inclide  10x3 10x3 wedge     Marching on foam  30x  30  30  30  30  30 30  30  30     Sports cord          Side and rusty 5 laps each     Rocker board      Ml;/ap 30x and balance 10''x5  [place] 30x ea 20x2 ball toss  20x2 ball toss  20x2     Wobble board   30  30  30  30         Step down   4in 10x2   6in 10x2 6in  10x2  6in 1-x2  6in 2x10 10x2 6in  10x3 6in BFR  10x2 no BFR 8in     Ther Ex             Gastroc stretch  30''x3  30''x3 towel  30''x3  30''x3  30''x3  30''x3  30''x3  30''x3 30''x3     Bike   5min  5min  5min  5min  5 min lvl 1 5min  5min  5,min     Std gastroc stretch     30''x3  30''x3   30''x3  30''x3                                                                       Ther Activity                                        Gait Training                                       Modalities

## 2025-01-16 ENCOUNTER — APPOINTMENT (OUTPATIENT)
Dept: PHYSICAL THERAPY | Facility: OTHER | Age: 35
End: 2025-01-16
Payer: COMMERCIAL

## 2025-01-20 ENCOUNTER — OFFICE VISIT (OUTPATIENT)
Dept: PHYSICAL THERAPY | Facility: OTHER | Age: 35
End: 2025-01-20
Payer: COMMERCIAL

## 2025-01-20 DIAGNOSIS — M76.62 LEFT ACHILLES TENDINITIS: Primary | ICD-10-CM

## 2025-01-20 PROCEDURE — 97110 THERAPEUTIC EXERCISES: CPT | Performed by: PEDIATRICS

## 2025-01-20 PROCEDURE — 97112 NEUROMUSCULAR REEDUCATION: CPT | Performed by: PEDIATRICS

## 2025-01-20 PROCEDURE — 97140 MANUAL THERAPY 1/> REGIONS: CPT | Performed by: PEDIATRICS

## 2025-01-20 NOTE — PROGRESS NOTES
Daily Note     Today's date: 2025  Patient name: Dieter Camargo  : 1990  MRN: [id number]  Referring provider: Petar Mark  Dx:   Encounter Diagnosis     ICD-10-CM    1. Left Achilles tendinitis  M76.62                      Subjective: progressing well overall feeling about 70% better.       Objective: See treatment diary below      Assessment: Tolerated treatment well. Added AP rockerboard rocks for balance and neuromuscular control. Focused on eccentric control during step downs within comfortable ROM. Patient demonstrated fatigue post treatment      Plan: Continue per plan of care.      Precautions: partial Achilles tear, follow PRP protocol          Manuals 12.2 12.4 12.9 12.11 12.16  12.31.24 1.10 1.20   PROM L ankle  MJ MJ  MJ  MJ MJ LQ MJ  MJ  MJ EW                                          Neuro Re-Ed             PF TB BFR  10x4 red  15x4  BFR reps  Bf reps  D/C  --       HR std    10x2  10x3  10x3 BFR  15x3 BFR  15x4 15x4 on wedge BFR  15x4 on wedge  15x4 on wedge BFR    Minisqaut  BFR 10x3  10x4 bfr   BFR 10x4  10x4 BFR  10x4 BFR  #10 10x4 bfr  #15 15x4 BFR  #15 15x4  #15 15x4    Slider BFR 3 3way  5x3   Bfr 5x4  Bfr 5x4  5x4 BFR  5x4 BFR  5x 4 BFR  5x3 no BFR  5x3  5x3   HR on Leg press  TR #80 10x4 BFR  Tr #90 10x4 bfr  #100 15x4  BFR #110 15x4  #110 15x4 bfr  #110 15x4 bfr  #125# 15x4  130# 15x43 #140 15x4  #140 15x4    Std TR  10x3  10x3  On wedge 10x3  On wedge 10x3  10x3  10x3 10x3 on inclide  10x3 10x3 wedge  10x3 wedge    Marching on foam  30x  30  30  30  30  30 30  30  30  30   Sports cord          Side and rusty 5 laps each  Side and rusty 5 laps each    Rocker board      Ml;/ap 30x and balance 10''x5  [place] 30x ea 20x2 ball toss  20x2 ball toss  20x2  20x2  AP control rocks    20x2 ball toss       Wobble board   30  30  30  30         Step down   4in 10x2   6in 10x2 6in  10x2  6in 1-x2  6in 2x10 10x2 6in  10x3 6in BFR  10x2 no BFR 8in  10x2  6 in ecc control  "  Ther Ex             Gastroc stretch  30''x3  30''x3 towel  30''x3  30''x3  30''x3  30''x3  30''x3  30''x3 30''x3  30\"x3   Bike   5min  5min  5min  5min  5 min lvl 1 5min  5min  5,min  5, min   Std gastroc stretch     30''x3  30''x3   30''x3  30''x3                                                                       Ther Activity                                       Gait Training                                       Modalities                                                        "

## 2025-01-23 ENCOUNTER — OFFICE VISIT (OUTPATIENT)
Dept: PHYSICAL THERAPY | Facility: OTHER | Age: 35
End: 2025-01-23
Payer: COMMERCIAL

## 2025-01-23 DIAGNOSIS — M76.62 LEFT ACHILLES TENDINITIS: Primary | ICD-10-CM

## 2025-01-23 PROCEDURE — 97140 MANUAL THERAPY 1/> REGIONS: CPT | Performed by: PHYSICAL THERAPIST

## 2025-01-23 PROCEDURE — 97112 NEUROMUSCULAR REEDUCATION: CPT | Performed by: PHYSICAL THERAPIST

## 2025-01-23 PROCEDURE — 97110 THERAPEUTIC EXERCISES: CPT | Performed by: PHYSICAL THERAPIST

## 2025-01-23 NOTE — PROGRESS NOTES
"Daily Note     Today's date: 2025  Patient name: Dieter Camargo  : 1990  MRN: [id number]  Referring provider: Petar Mark  Dx: No diagnosis found.               Subjective: Foam roll calf today some soreness unknown reason distal achliiesresolved afte the took NSAID today.       Objective: See treatment diary below      Assessment: Tolerated treatment well. Patient demonstrated fatigue post treatment      Plan: Continue per plan of care.      Precautions: partial Achilles tear, follow PRP protocol          Manuals  12.31.24 1.10 1.23   PROM L ankle  MJ  MJ  MJ MJ   Post glide of talus     MJ    Foam roll calf     MJ          Neuro Re-Ed       PF TB BFR        HR std  15x4 15x4 on wedge BFR  15x4 on wedge  15x4 on wedge BFR    Minisqaut  #10 10x4 bfr  #15 15x4 BFR  #15 15x4  #20 15x4 BFR    Slider BFR 3 3way  5x 4 BFR  5x3 no BFR  5x3  5x3 BFR    HR on Leg press  #125# 15x4  130# 15x43 #140 15x4  #140 15x4    Std TR  10x3 on inclide  10x3 10x3 wedge  10x3 wedge  BFR    Marching on foam  30  30  30  30   Sports cord    Side and rusty 5 laps each  Side and rusty 5 laps each    Rocker board  20x2 ball toss  20x2 ball toss  20x2  20x2  AP control rocks    20x2 ball toss       Wobble board        Step down  10x2 6in  10x3 6in BFR  10x2 no BFR 8in  10x2  6 in ecc control BFR    Ther Ex       Gastroc stretch  30''x3  30''x3 30''x3  30\"x3   Bike  5min  5min  5,min  5, min   Std gastroc stretch  30''x3  30''x3   30''x3                                       Ther Activity                     Gait Training                     Modalities                                        "

## 2025-01-27 ENCOUNTER — OFFICE VISIT (OUTPATIENT)
Dept: PHYSICAL THERAPY | Facility: OTHER | Age: 35
End: 2025-01-27
Payer: COMMERCIAL

## 2025-01-27 DIAGNOSIS — M76.62 LEFT ACHILLES TENDINITIS: Primary | ICD-10-CM

## 2025-01-27 PROCEDURE — 97112 NEUROMUSCULAR REEDUCATION: CPT | Performed by: PHYSICAL THERAPIST

## 2025-01-27 PROCEDURE — 97110 THERAPEUTIC EXERCISES: CPT | Performed by: PHYSICAL THERAPIST

## 2025-01-27 NOTE — PROGRESS NOTES
"Daily Note     Today's date: 2025  Patient name: Dieter Camargo  : 1990  MRN: [id number]  Referring provider: Petar Mark  Dx:   Encounter Diagnosis     ICD-10-CM    1. Left Achilles tendinitis  M76.62                      Subjective: Patient reports symptoms are intermittent but improving.       Objective: See treatment diary below      Assessment: Tolerated treatment well. Patient demonstrated fatigue post treatment with no adverse events. Continue to progress loading per protocol.       Plan: Continue per plan of care.      Precautions: partial Achilles tear, follow PRP protocol          Manuals  12.31.24 1.10 1.23    PROM L ankle  MJ  MJ  MJ MJ    Post glide of talus     MJ     Foam roll calf     MJ            Neuro Re-Ed        PF TB BFR         HR std  15x4 15x4 on wedge BFR  15x4 on wedge  15x4 on wedge BFR  15 x 4 wedge BFR   Minisqaut  #10 10x4 bfr  #15 15x4 BFR  #15 15x4  #20 15x4 BFR  # 15 x 4   BFR   Slider BFR 3 3way  5x 4 BFR  5x3 no BFR  5x3  5x3 BFR  5 x 3    HR on Leg press  #125# 15x4  130# 15x43 #140 15x4  #140 15x4  #140 4 x 12   Std TR  10x3 on inclide  10x3 10x3 wedge  10x3 wedge  BFR  10  x 3  wedge BRF   Marching on foam  30  30  30  30    Sports cord    Side and rusty 5 laps each  Side and rusty 5 laps each     Rocker board  20x2 ball toss  20x2 ball toss  20x2  20x2  AP control rocks    20x2 ball toss     20 x 2 AP control 20 x 2 ball toss    Wobble board         Step down  10x2 6in  10x3 6in BFR  10x2 no BFR 8in  10x2  6 in ecc control BFR  2 x 12 6 in   Ther Ex        Gastroc stretch  30''x3  30''x3 30''x3  30\"x3    Bike  5min  5min  5,min  5, min 5 mins   Std gastroc stretch  30''x3  30''x3   30''x3                                             Ther Activity                        Gait Training                        Modalities                                           "

## 2025-01-30 ENCOUNTER — OFFICE VISIT (OUTPATIENT)
Dept: PHYSICAL THERAPY | Facility: OTHER | Age: 35
End: 2025-01-30
Payer: COMMERCIAL

## 2025-01-30 DIAGNOSIS — M76.62 LEFT ACHILLES TENDINITIS: Primary | ICD-10-CM

## 2025-01-30 PROCEDURE — 97112 NEUROMUSCULAR REEDUCATION: CPT

## 2025-01-30 NOTE — PROGRESS NOTES
"Daily Note     Today's date: 2025  Patient name: Dieter Camargo  : 1990  MRN: [id number]  Referring provider: Petar Mark  Dx:   Encounter Diagnosis     ICD-10-CM    1. Left Achilles tendinitis  M76.62           Start Time: 1608  Stop Time: 1645  Total time in clinic (min): 37 minutes    Subjective: Presents to therapy noting no changes in his symptoms over the past few weeks.       Objective: See treatment diary below      Assessment: Patient able to complete program continuing to focus on lower extremity strengthening with the use of blood flow restriction. Rest breaks needed throughout to allow for appropriate muscle recovery. Patient subjectively reported increased achilles stretch with slight irritation during leg press. Patient  displayed post exercise soreness upon leaving clinic and would benefit from continued PT.       Plan: Continue per plan of care.      Precautions: partial Achilles tear, follow PRP protocol          Manuals  12.31.24 1.10 1.23    PROM L ankle   MJ  MJ MJ    Post glide of talus     MJ     Foam roll calf     MJ            Neuro Re-Ed        PF TB BFR         HR std  15x4  Wedge  BFR 15x4 on wedge BFR  15x4 on wedge  15x4 on wedge BFR  15 x 4 wedge BFR   Minisqaut  15# 4x15  BFR  #15 15x4 BFR  #15 15x4  #20 15x4 BFR  # 15 x 4   BFR   Slider BFR 3 3way  5x3 ea  BFR 5x3 no BFR  5x3  5x3 BFR  5 x 3    HR on Leg press   130# 15x43 #140 15x4  #140 15x4  #140 4 x 12   Std TR  3x15  Wedge  BRF 10x3 10x3 wedge  10x3 wedge  BFR  10  x 3  wedge BRF   Marching on foam   30  30  30    Sports cord    Side and rusty 5 laps each  Side and rusty 5 laps each     Rocker board   20x2 ball toss  20x2  20x2  AP control rocks    20x2 ball toss     20 x 2 AP control 20 x 2 ball toss    Wobble board         Step down  10x2 6in  10x3 6in BFR  10x2 no BFR 8in  10x2  6 in ecc control BFR  2 x 12 6 in   Ther Ex        Gastroc stretch   30''x3 30''x3  30\"x3    Bike   5min  5,min  5, " min 5 mins   Std gastroc stretch   30''x3   30''x3                                             Ther Activity                        Gait Training                        Modalities

## 2025-02-03 ENCOUNTER — OFFICE VISIT (OUTPATIENT)
Dept: PHYSICAL THERAPY | Facility: OTHER | Age: 35
End: 2025-02-03
Payer: COMMERCIAL

## 2025-02-03 DIAGNOSIS — M76.62 LEFT ACHILLES TENDINITIS: Primary | ICD-10-CM

## 2025-02-03 PROCEDURE — 97140 MANUAL THERAPY 1/> REGIONS: CPT | Performed by: PHYSICAL THERAPIST

## 2025-02-03 PROCEDURE — 97112 NEUROMUSCULAR REEDUCATION: CPT | Performed by: PHYSICAL THERAPIST

## 2025-02-04 NOTE — PROGRESS NOTES
Daily Note     Today's date: 2025  Patient name: Dieter Camargo  : 1990  MRN: [id number]  Referring provider: Petar Mark  Dx:   Encounter Diagnosis     ICD-10-CM    1. Left Achilles tendinitis  M76.62                      Subjective: minmal TTP some soreness while shoveling the snow  RA NV       Objective: See treatment diary below      Assessment: Tolerated treatment well. Patient demonstrated fatigue post treatment      Plan: Continue per plan of care.      Precautions: partial Achilles tear, follow PRP protocol          Manuals 2.4   PROM L ankle  MJ   Post glide of talus  MJ   Foam roll calf         Neuro Re-Ed    PF TB BFR     HR std  15 x 4 wedge BFR BFR    Minisqaut  # 15 15 x 4   BFR   Slider BFR 3 3way  5 x 3    HR on Leg press  #140 4 x 12   Std TR  10  x 3  wedge BRF   Marching on foam     Sports cord  5 laps    Rocker board  20 x 2 AP control 20 x 2 ball toss    Wobble board     Step down  2 x 12 6 in BFR    Ther Ex    Gastroc stretch  10''x10    Bike  5 mins   Std gastroc stretch                         Ther Activity            Gait Training            Modalities

## 2025-02-06 ENCOUNTER — APPOINTMENT (OUTPATIENT)
Dept: PHYSICAL THERAPY | Facility: OTHER | Age: 35
End: 2025-02-06
Payer: COMMERCIAL

## 2025-02-12 ENCOUNTER — APPOINTMENT (OUTPATIENT)
Dept: PHYSICAL THERAPY | Facility: OTHER | Age: 35
End: 2025-02-12
Payer: COMMERCIAL

## 2025-02-13 ENCOUNTER — OFFICE VISIT (OUTPATIENT)
Dept: OBGYN CLINIC | Facility: OTHER | Age: 35
End: 2025-02-13
Payer: COMMERCIAL

## 2025-02-13 VITALS — BODY MASS INDEX: 38.21 KG/M2 | HEIGHT: 69 IN | WEIGHT: 258 LBS

## 2025-02-13 DIAGNOSIS — M25.572 CHRONIC PAIN OF LEFT ANKLE: ICD-10-CM

## 2025-02-13 DIAGNOSIS — G89.29 CHRONIC PAIN OF LEFT ANKLE: ICD-10-CM

## 2025-02-13 DIAGNOSIS — M76.62 ACHILLES TENDINITIS, LEFT LEG: Primary | ICD-10-CM

## 2025-02-13 DIAGNOSIS — S86.012A PARTIAL TEAR OF LEFT ACHILLES TENDON, INITIAL ENCOUNTER: ICD-10-CM

## 2025-02-13 PROCEDURE — 99214 OFFICE O/P EST MOD 30 MIN: CPT | Performed by: FAMILY MEDICINE

## 2025-02-14 NOTE — PATIENT INSTRUCTIONS
I recommended patient continue with home exercise program and explained that he likely will continue to have improvement over the next weeks to months.  He is currently satisfied with his progress and has goal of riding bicycle with his children.  I explained that based on his current progress there is a high likelihood that he will be able to return to his desired activity level.  In the interim he is to continue with home exercise program and avoid any high risk activity including running until symptoms are 100% resolved.

## 2025-02-14 NOTE — PROGRESS NOTES
1. Achilles tendinitis, left leg        2. Partial tear of left Achilles tendon, initial encounter        3. Chronic pain of left ankle              No orders of the defined types were placed in this encounter.       IMAGING STUDIES: (I personally reviewed images in PACS and report):  Point-of-care ultrasound 2/13/2025 left ankle posterior:  No visible significant defect at the insertion of the Achilles tendon onto the calcaneus in short or long axis                      PAST REPORTS:  - MRI ankle/heel left wo contrast 07/06/24  Achilles insertional tendinosis with reactive marrow edema at the calcaneus.   There is a partial tear at the insertion that is estimated at 50% thickness and extent and measuring up to 8 mm in transverse dimension.              ASSESSMENT/PLAN:  Chronic Left Achilles Tendinitis  Partial Achilles Insertional Tear  - Patient received ultrasound-guided PRP tenotomy and PRP injection of the insertional Achilles tendon 9/24/2024  Follow-up from procedure: 4 months    Repeat X-ray next visit: None    Return in about 8 weeks (around 4/10/2025).    Patient instructions below verbally summarized in person during encounter:  Patient Instructions   I recommended patient continue with home exercise program and explained that he likely will continue to have improvement over the next weeks to months.  He is currently satisfied with his progress and has goal of riding bicycle with his children.  I explained that based on his current progress there is a high likelihood that he will be able to return to his desired activity level.  In the interim he is to continue with home exercise program and avoid any high risk activity including running until symptoms are 100% resolved.      __________________________________________________________________________    HISTORY OF PRESENT ILLNESS:    11/7/24:  For evaluation of left Achilles tendinitis and partial insertional tear.  6 weeks out from procedure.  Patient  has been compliant with controlled ankle motion boot and 3 heel lifts.  He does note improvement since his procedure day but does still continue to have tenderness to touch along the posterior heel.    F/U Visit 1/2/25  Today feels that achilles tendon insertion pain is 90% overall. Mild tenderness 1/10 today compared to previous 5/10 previously. He was able to run for a package in his home 10 feet with no pain.     Visit 2/14/2025:  Follow-up evaluation of chronic Achilles insertional tendinitis with partial tear.  Overall patient feels that he is approximately 85% improved.  He denies any pain with walking.  He did notice recently pain with shoveling snow.  He does have tenderness on palpation on examination.  His goal for return to activity level is bicycling with children this spring and summer.  He feels that his current pain level is significantly reduced to approximately 2 out of 10 when it occurs and significantly improved compared to time of his initial evaluation.      Review of Systems      Following history reviewed and update:    Past Medical History:   Diagnosis Date    Allergic     Seasonal    Asthma 1999    Sleep apnea      Past Surgical History:   Procedure Laterality Date    MANDIBLE SURGERY      [place]    [place] VASECTOMY UNI/BI SPX W/POSTOP SEMEN EXAMS Right 1/13/2023    Procedure: RIGHT  SCROTUM EXPLORATION;  Surgeon: Sancho Ritchie MD;  Location: [facility] MAIN OR;  Service: Urology    WISDOM TOOTH EXTRACTION       Social History   Social History     Substance and Sexual Activity   Alcohol Use Yes    Comment: Rarely     Social History     Substance and Sexual Activity   Drug Use Never     Social History     Tobacco Use   Smoking Status Never   Smokeless Tobacco Never     Family History   Problem Relation Age of Onset    Diabetes type II Mother     Hypertension Mother     Diabetes Mother         Type 2    Heart disease Father     Diabetes type I Maternal Grandmother     Diabetes Maternal  "Grandmother         Type 1     No Known Allergies       Physical Exam  Ht 5' 9\" (1.753 m)   Wt 117 kg (258 lb)   BMI 38.10 kg/m²         Ortho Exam    LEFT ACHILLES EXAMINATION:  Mild tenderness insertion calcaneus  Palpable Gap or Defect of Achilles: none            __________________________________________________________________________  Procedures                   "

## 2025-02-17 ENCOUNTER — APPOINTMENT (OUTPATIENT)
Dept: PHYSICAL THERAPY | Facility: OTHER | Age: 35
End: 2025-02-17
Payer: COMMERCIAL

## 2025-02-20 ENCOUNTER — OFFICE VISIT (OUTPATIENT)
Dept: PHYSICAL THERAPY | Facility: OTHER | Age: 35
End: 2025-02-20
Payer: COMMERCIAL

## 2025-02-20 DIAGNOSIS — M76.62 LEFT ACHILLES TENDINITIS: Primary | ICD-10-CM

## 2025-02-20 PROCEDURE — 97110 THERAPEUTIC EXERCISES: CPT

## 2025-02-20 PROCEDURE — 97140 MANUAL THERAPY 1/> REGIONS: CPT

## 2025-02-20 PROCEDURE — 97112 NEUROMUSCULAR REEDUCATION: CPT

## 2025-02-20 NOTE — PROGRESS NOTES
"Daily Note     Today's date: 2025  Patient name: Dieter Camargo  : 1990  MRN: [id number]  Referring provider: Petar Mark  Dx:   Encounter Diagnosis     ICD-10-CM    1. Left Achilles tendinitis  M76.62                      Subjective: Pt reports that he has been feeling good.        Objective: See treatment diary below      Assessment: Patient was educated on HEP and on the importance of adherence, with the pt demonstrating understanding. HEP comprised on BL heel raises with a deficit, gastrocnemius stretches, and lateral step downs. Plan next session is to assess how the patient is tolerating his HEP and incorporate more exercises, if necessary. Intensity of step downs were progressed to further strengthen the quads/glutes.       Plan: Continue per plan of care.  Progress treatment as tolerated.       Precautions: partial Achilles tear, follow PRP protocol     POC expires Unit limit Auth Expiration date PT/OT + Visit Limit?   25 BOMN N/A BOMN                               : BFR LOP: 241mmHg, BFR performed at 80%     Manuals 2.4    PROM L ankle  MJ RM   Post glide of talus  MJ RM   Foam roll calf           Neuro Re-Ed     PF TB BFR      HR std  15 x 4 wedge BFR BFR  4x15 on wedge BFR   Minisqaut  # 15 15 x 4   BFR 15# 15x4 BFR   Slider BFR 3 3way  5 x 3  3x5 ea way   HR on Leg press  #140 4 x 12 140# 4x12   Std TR  10  x 3  wedge BRF    Marching on foam      Sports cord  5 laps     Rocker board  20 x 2 AP control 20 x 2 ball toss     Wobble board      Step down  2 x 12 6 in BFR  1x12 6in, 2x12 12in    Ther Ex     Gastroc stretch  10''x10  DF stretch w/ towel 10\"x10   Bike  5 mins 5 min   Std gastroc stretch                               Ther Activity     Education  HEP education - RM        Gait Training               Modalities                 Suha Ruff, PT, DPT                   "

## 2025-02-24 ENCOUNTER — OFFICE VISIT (OUTPATIENT)
Dept: PHYSICAL THERAPY | Facility: OTHER | Age: 35
End: 2025-02-24
Payer: COMMERCIAL

## 2025-02-24 DIAGNOSIS — M76.62 LEFT ACHILLES TENDINITIS: Primary | ICD-10-CM

## 2025-02-24 PROCEDURE — 97112 NEUROMUSCULAR REEDUCATION: CPT | Performed by: PEDIATRICS

## 2025-02-24 PROCEDURE — 97140 MANUAL THERAPY 1/> REGIONS: CPT | Performed by: PEDIATRICS

## 2025-02-24 NOTE — PROGRESS NOTES
"Daily Note     Today's date: 2025  Patient name: Dieter Camargo  : 1990  MRN: [id number]  Referring provider: Petar Mark  Dx:   Encounter Diagnosis     ICD-10-CM    1. Left Achilles tendinitis  M76.62                      Subjective: Pt reports that he has been feeling good.  Ultimate goal is to be able to run.       Objective: See treatment diary below      Assessment: Patient discussed with PTA his plan to DC after NV. Suggested patient continue with strengthening at home before jumping back into running. Also discussed option for antigravity treadmill running if he is interested in that in the future. Noted bilateral pronation during squatting and single leg activities. Cued patient on short foot which demonstrated slight improvement. Discussed possibility of custom orthotics. Did not perform BFR today to address possible exercises to add to HEP. Patient will see primary therapist NV and review plan for DC.      Plan: Continue per plan of care.  Progress treatment as tolerated.       Precautions: partial Achilles tear, follow PRP protocol     POC expires Unit limit Auth Expiration date PT/OT + Visit Limit?   25 BOMN N/A BOMN                               : BFR LOP: 241mmHg, BFR performed at 80%     Manuals 2.4    PROM L ankle  MJ RM EW   Post glide of talus  MJ RM    Foam roll calf             Neuro Re-Ed      PF TB BFR       HR std  15 x 4 wedge BFR BFR  4x15 on wedge BFR Eccentric 3x10   Minisqaut  # 15 15 x 4   BFR 15# 15x4 BFR 20#  3x10  BTB at knees   Slider BFR 3 3way  5 x 3  3x5 ea way 3x5 ea way   HR on Leg press  #140 4 x 12 140# 4x12    Std TR  10  x 3  wedge BRF     Marching on foam       Sports cord  5 laps      Rocker board  20 x 2 AP control 20 x 2 ball toss      Wobble board       Step down  2 x 12 6 in BFR  1x12 6in, 2x12 12in  1x12 6in, 2x12 12in    SL blazebpod focus   30\"x3   Ther Ex      Gastroc stretch  10''x10  DF stretch w/ towel 10\"x10  "   Bike  5 mins 5 min    Std gastroc stretch       Side stepping w/ TB at arches   3 way  Blue  2 laps ea   lunges   2x10                     Ther Activity      Education  HEP education - RM          Gait Training                  Modalities

## 2025-03-03 ENCOUNTER — APPOINTMENT (OUTPATIENT)
Dept: PHYSICAL THERAPY | Facility: OTHER | Age: 35
End: 2025-03-03
Payer: COMMERCIAL

## 2025-03-10 ENCOUNTER — OFFICE VISIT (OUTPATIENT)
Dept: PHYSICAL THERAPY | Facility: OTHER | Age: 35
End: 2025-03-10
Payer: COMMERCIAL

## 2025-03-10 DIAGNOSIS — M76.62 LEFT ACHILLES TENDINITIS: Primary | ICD-10-CM

## 2025-03-10 PROCEDURE — 97112 NEUROMUSCULAR REEDUCATION: CPT | Performed by: PHYSICAL THERAPIST

## 2025-03-10 PROCEDURE — 97140 MANUAL THERAPY 1/> REGIONS: CPT | Performed by: PHYSICAL THERAPIST

## 2025-03-10 NOTE — PROGRESS NOTES
"Daily Note     Today's date: 3/10/2025  Patient name: Dieter Camargo  : 1990  MRN: [id number]  Referring provider: Petar Mark  Dx:   Encounter Diagnosis     ICD-10-CM    1. Left Achilles tendinitis  M76.62                      Subjective: no TTP. Carloies is feeling very good. He is able to perofmron IALD;s with out pain.     PF pain, evaluated. We discussed custom orthotics he will return another day for this. Exercises given and trial EPAT. Patient tolerated treatment well.     Objective: See treatment diary below      Assessment: Tolerated treatment well. Patient demonstrated fatigue post treatment      Plan: Continue per plan of care.      Precautions: partial Achilles tear, follow PRP protocol     POC expires Unit limit Auth Expiration date PT/OT + Visit Limit?   25 BOMN N/A BOMN                               : BFR LOP: 241mmHg, BFR performed at 80%     Manuals 2.4  3.10   PROM L ankle  MJ RM EW MJ    Post glide of talus  MJ RM     Foam roll calf        EPAT     2000 red head PF    Neuro Re-Ed       PF TB BFR        HR std  15 x 4 wedge BFR BFR  4x15 on wedge BFR Eccentric 3x10    Minisqaut  # 15 15 x 4   BFR 15# 15x4 BFR 20#  3x10  BTB at knees    Trial of rolling foot with ball        Rdl with short foot        Toe yoga        Slider BFR 3 3way  5 x 3  3x5 ea way 3x5 ea way    HR on Leg press  #140 4 x 12 140# 4x12     Std TR  10  x 3  wedge BRF      Marching on foam        Sports cord  5 laps       Rocker board  20 x 2 AP control 20 x 2 ball toss       Wobble board        Step down  2 x 12 6 in BFR  1x12 6in, 2x12 12in  1x12 6in, 2x12 12in     SL blazebpod focus   30\"x3    Ther Ex       Gastroc stretch  10''x10  DF stretch w/ towel 10\"x10     Bike  5 mins 5 min     Std gastroc stretch        Side stepping w/ TB at arches   3 way  Blue  2 laps ea    lunges   2x10                         Ther Activity       Education  HEP education - RM            Gait Training        "              Modalities

## 2025-04-02 ENCOUNTER — EVALUATION (OUTPATIENT)
Dept: PHYSICAL THERAPY | Facility: OTHER | Age: 35
End: 2025-04-02
Payer: COMMERCIAL

## 2025-04-02 DIAGNOSIS — M76.62 LEFT ACHILLES TENDINITIS: Primary | ICD-10-CM

## 2025-04-02 PROCEDURE — 97164 PT RE-EVAL EST PLAN CARE: CPT | Performed by: PHYSICAL THERAPIST

## 2025-04-02 PROCEDURE — 97760 ORTHOTIC MGMT&TRAING 1ST ENC: CPT | Performed by: PHYSICAL THERAPIST

## 2025-04-02 NOTE — PROGRESS NOTES
Orthotic Evaluation    Today's date: 25  Patient name: Dieter Camargo  : 1990  MRN: [id number]  Referring provider: Petar Mark  Dx:   Encounter Diagnosis     ICD-10-CM    1. Left Achilles tendinitis  M76.62           Start Time: 0830  Stop Time: 0900  Total time in clinic (min): 30 minutes     Subjective:    Dieter presents today for orthotic evaluation. he complains of pain with functional activities including ambulation, leisure, and athletics. The patient plans to use his orthotic for athletics, walking, and standing. The orthotic will be placed in a standard, size 10.5. Patient reports chronic history of achilles pain. He has been followed by podiatry, PT, and primary care sports med. He ultimately had PRP which has worked well. He does frequently have foot pain after this period of less activity. 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 Patient reports he was very active in his youth and feels this may have contributed to his pain.     Objective:    Age: 34 y.o.  Weight: 260    Foot Posture Index Score    Right Foot  Talar dome - +1  Talonavicular bulge - 0  Medial longitudinal arch - 0  Malleolar curve - 0   Calcaneal eversion - +1  Too many toes - 0  Total Score R = 2    Left Foot  Talar dome - +1  Talonavicular bulge - 0  Medial longitudinal arch - 0  Malleolar curve - +1   Calcaneal eversion - +1  Too many toes - 0  Total Score L = 3    Reference Values  Normal =    0 to +5  Pronated =  +6 to +9 Highly Pronated =  10+  Supinated =   -1 to -4 Highly Supinated = -5 to -12    Objective     Static Posture     Comments  Heel raise- little inversion  Mini squat- mild excessive pronation  Gait avoids push off through forefoot in late stance, early rise, excessive load of lateral column on left (reports this is learned pattern, but is attempting to retrain gait)    Active Range of Motion   Left Ankle/Foot   Dorsiflexion (ke): 0 degrees   Plantar flexion: WFL  Inversion:  WFL  Eversion: WFL    Right Ankle/Foot   Dorsiflexion (ke): 0 degrees   Plantar flexion: with pain  Inversion: with pain  Eversion: with pain    Joint Play   Left Ankle/Foot  Hypomobile in the talocrural joint, subtalar joint and midfoot.     Right Ankle/Foot  Hypomobile in the talocrural joint, subtalar joint and midfoot.       Assessment:    Patient requires custom foot orthosis with deepened heel cup, neutral post to correct altered gait mechanics. Patient is not currently controlled by his current shoe wear. Patient was educated on the design of the custom orthotic and it's ability to offload his current pathology. Patient was also educated on potential shoe wear changes with device, break-in period, and skin checks to avoid potential skin break down. Educated patient on importance of continued achilles stretching and loading. He continues with significant lack of dorsiflexion and joint mobility despite extensive rehab.     Orthotic goals:  1) Patient will have custom foot orthoses fitted to his shoe.   2) Patient will be compliant with break-in period of custom foot orthoses as prescribed by PT.   3) Patient will be compliant with custom foot orthoses use as prescribed by PT.     Plan:    Planned therapy interventions: orthotic fitting/training  Duration in visits: 2

## 2025-05-07 ENCOUNTER — OFFICE VISIT (OUTPATIENT)
Dept: PHYSICAL THERAPY | Facility: OTHER | Age: 35
End: 2025-05-07
Payer: COMMERCIAL

## 2025-05-07 DIAGNOSIS — M76.62 ACHILLES TENDINITIS OF LEFT LOWER EXTREMITY: Primary | ICD-10-CM

## 2025-05-07 PROCEDURE — L3010 FOOT LONGITUDINAL ARCH SUPPO: HCPCS | Performed by: PHYSICAL THERAPIST

## 2025-05-22 ENCOUNTER — APPOINTMENT (OUTPATIENT)
Dept: RADIOLOGY | Age: 35
End: 2025-05-22
Payer: COMMERCIAL

## 2025-05-22 ENCOUNTER — OFFICE VISIT (OUTPATIENT)
Dept: URGENT CARE | Age: 35
End: 2025-05-22
Payer: COMMERCIAL

## 2025-05-22 VITALS
WEIGHT: 260 LBS | BODY MASS INDEX: 38.4 KG/M2 | OXYGEN SATURATION: 99 % | DIASTOLIC BLOOD PRESSURE: 100 MMHG | HEART RATE: 70 BPM | SYSTOLIC BLOOD PRESSURE: 161 MMHG | TEMPERATURE: 98.5 F | RESPIRATION RATE: 18 BRPM

## 2025-05-22 DIAGNOSIS — M70.31 BURSITIS OF RIGHT ELBOW, UNSPECIFIED BURSA: ICD-10-CM

## 2025-05-22 DIAGNOSIS — M70.31 BURSITIS OF RIGHT ELBOW, UNSPECIFIED BURSA: Primary | ICD-10-CM

## 2025-05-22 PROCEDURE — 73080 X-RAY EXAM OF ELBOW: CPT

## 2025-05-22 PROCEDURE — 99213 OFFICE O/P EST LOW 20 MIN: CPT

## 2025-05-22 RX ORDER — NAPROXEN 500 MG/1
500 TABLET ORAL 2 TIMES DAILY WITH MEALS
Qty: 30 TABLET | Refills: 0 | Status: SHIPPED | OUTPATIENT
Start: 2025-05-22

## 2025-05-23 ENCOUNTER — DOCUMENTATION (OUTPATIENT)
Dept: ADMINISTRATIVE | Facility: OTHER | Age: 35
End: 2025-05-23

## 2025-05-23 VITALS — SYSTOLIC BLOOD PRESSURE: 161 MMHG | DIASTOLIC BLOOD PRESSURE: 100 MMHG

## 2025-05-23 NOTE — PATIENT INSTRUCTIONS
Xray right elbow  : Negative for acte fracture or dislocation identified by me.  If the radiologist has any positive findings, we will contact you.  You may monitor MyChart for your results.  Take 600 to 800 mg of ibuprofen every 8 hours.  Supplement with Tylenol 1000 mg every 8 hours as needed, alternating every 4 hours with ibuprofen.  Ice 20 minutes on 20 minutes off.  Elevate above the level of the heart whenever not in use.  If symptoms or not improved in 3 to 5 days follow-up with PCP or Ortho.  If symptoms worsen or new symptoms develop report to the emergency room immediately.

## 2025-05-23 NOTE — PROGRESS NOTES
[facility]        NAME: Dieter Camargo is a 34 y.o. male  : 1990    MRN: [id number]  DATE: May 22, 2025  TIME: 8:26 PM    Assessment and Plan   Bursitis of right elbow, unspecified bursa [M70.31]  1. Bursitis of right elbow, unspecified bursa  Ambulatory Referral to Orthopedic Surgery    naproxen (EC NAPROSYN) 500 MG EC tablet    XR elbow 3+ vw right    CANCELED: XR foot 3+ vw right        Xray right elbow: Negative for acte fracture or dislocation identified by me., pending final read.  Conservative pain management with tylenol/motrin  Ortho referral placed.   RICE  Follow up with PCP if no improvement in 5-7 days.      Patient Instructions       Follow up with PCP in 3-5 days.  Proceed to  ER if symptoms worsen.    If tests have been performed at [facility], our office will contact you with results if changes need to be made to the care plan discussed with you at the visit.  You can review your full results on [facility] MyChart.    Chief Complaint     Chief Complaint   Patient presents with   • Elbow Pain     Pt began with painful extension of right arm beginning yesterday. Not aware of injury; held today's medications.          History of Present Illness       Pt I a 34 year old male presenting with 3 days of right elbow pain.  He denies falls, trauma, overuse, or other injuries to right upper extremity.  He notes pain to the elbow when applying pressure, or maneuvering mouse on computer.  He denies numbness, tingling, or weakness.  He has not taken anything for the pain.     Elbow Pain  Associated symptoms include joint swelling.       Review of Systems   Review of Systems   Musculoskeletal:  Positive for joint swelling.         Current Medications     Current Medications[1]    Current Allergies     Allergies as of 2025   • (No Known Allergies)            The following portions of the patient's history were reviewed and updated as appropriate: allergies, current medications, past family  history, past medical history, past social history, past surgical history and problem list.     Past Medical History[2]    Past Surgical History[3]    Family History[4]      Medications have been verified.        Objective   /100   Pulse 70   Temp 98.5 °F (36.9 °C)   Resp 18   Wt 118 kg (260 lb)   SpO2 99%   BMI 38.40 kg/m²   No LMP for male patient.       Physical Exam     Physical Exam  Vitals and nursing note reviewed.   Constitutional:       General: He is not in acute distress.     Appearance: Normal appearance. He is normal weight.     Musculoskeletal:      Right elbow: Swelling present. No deformity, effusion or lacerations. Normal range of motion. Tenderness present.      Right hand: No swelling or bony tenderness. Normal range of motion. Normal strength. There is no disruption of two-point discrimination. Normal capillary refill. Normal pulse.        Arms:      Neurological:      Mental Status: He is alert.                          [1]    Current Outpatient Medications:   •  albuterol (Proventil HFA) 90 mcg/act inhaler, Inhale 2 puffs every 6 (six) hours as needed for wheezing, Disp: 6.7 g, Rfl: 5  •  amLODIPine (NORVASC) 5 mg tablet, Take 1 tablet (5 mg total) by mouth daily, Disp: 90 tablet, Rfl: 0  •  Diclofenac Sodium (VOLTAREN) 1 %, Apply 2 g topically 4 (four) times a day, Disp: 100 g, Rfl: 1  •  lisinopril (ZESTRIL) 10 mg tablet, Take 1 tablet (10 mg total) by mouth daily, Disp: 90 tablet, Rfl: 0  •  metFORMIN (GLUCOPHAGE-XR) 500 mg 24 hr tablet, Take 1 tablet (500 mg total) by mouth 2 (two) times a day with meals, Disp: 180 tablet, Rfl: 3  •  naproxen (EC NAPROSYN) 500 MG EC tablet, Take 1 tablet (500 mg total) by mouth 2 (two) times a day with meals, Disp: 30 tablet, Rfl: 0  •  rosuvastatin (CRESTOR) 10 MG tablet, Take 1 tablet (10 mg total) by mouth daily, Disp: 90 tablet, Rfl: 1  •  oxyCODONE-acetaminophen (Percocet) 5-325 mg per tablet, Take 1 tablet by mouth every 4 (four) hours as  needed for moderate pain for up to 10 doses Max Daily Amount: 6 tablets (Patient not taking: Reported on 1/2/2025), Disp: 10 tablet, Rfl: 0[2]  Past Medical History:  Diagnosis Date   • Allergic     Seasonal   • Asthma 1999   • Sleep apnea    [3]  Past Surgical History:  Procedure Laterality Date   • MANDIBLE SURGERY      [place]   • OR VASECTOMY UNI/BI SPX W/POSTOP SEMEN EXAMS Right 1/13/2023    Procedure: RIGHT  SCROTUM EXPLORATION;  Surgeon: Sancho Ritchie MD;  Location: [facility] MAIN OR;  Service: Urology   • WISDOM TOOTH EXTRACTION     [4]  Family History  Problem Relation Name Age of Onset   • Diabetes type II Mother Pamela    • Hypertension Mother Pamela    • Diabetes Mother Pamela         Type 2   • Heart disease Father Ozzy    • Diabetes type I Maternal Grandmother Sidra Oscar    • Diabetes Maternal Grandmother Sidra Oscar         Type 1

## 2025-05-23 NOTE — PROGRESS NOTES
ALEX Paulson  P Patient Reported Team  Blood pressure elevated  Appointment department: [facility]  Appointment provider: ALEX Paulson    Blood pressure  05/22/25 2002 161/100  05/22/25 1957 (!) 162/102      05/23/25 1:28 PM    Patient was called after the Urgent Care visit ; there was no answer/ a message could not be left.    Thank you.  Yelitza Nunes MA  [facility]

## (undated) DEVICE — PENCIL ELECTROSURG E-Z CLEAN -0035H

## (undated) DEVICE — SUT SILK 2-0 TIES 144 IN LA55G

## (undated) DEVICE — INTENDED FOR TISSUE SEPARATION, AND OTHER PROCEDURES THAT REQUIRE A SHARP SURGICAL BLADE TO PUNCTURE OR CUT.: Brand: BARD-PARKER SAFETY BLADES SIZE 15, STERILE

## (undated) DEVICE — NEEDLE 25G X 1 1/2

## (undated) DEVICE — PREMIUM DRY TRAY LF: Brand: MEDLINE INDUSTRIES, INC.

## (undated) DEVICE — PAD GROUNDING ADULT

## (undated) DEVICE — ADHESIVE SKIN HIGH VISCOSITY EXOFIN 1ML

## (undated) DEVICE — GLOVE SRG BIOGEL ECLIPSE 7

## (undated) DEVICE — MINOR PROCEDURE DRAPE: Brand: CONVERTORS

## (undated) DEVICE — CHLORHEXIDINE 4PCT 4 OZ

## (undated) DEVICE — BETHLEHEM UNIVERSAL OUTPATIENT: Brand: CARDINAL HEALTH